# Patient Record
Sex: MALE | Race: WHITE | NOT HISPANIC OR LATINO | URBAN - METROPOLITAN AREA
[De-identification: names, ages, dates, MRNs, and addresses within clinical notes are randomized per-mention and may not be internally consistent; named-entity substitution may affect disease eponyms.]

---

## 2023-10-18 ENCOUNTER — E-VISIT (OUTPATIENT)
Dept: PRIMARY CARE | Facility: CLINIC | Age: 53
End: 2023-10-18

## 2023-10-18 DIAGNOSIS — H33.20 RETINAL DETACHMENT, UNSPECIFIED LATERALITY: Primary | ICD-10-CM

## 2023-10-18 PROCEDURE — 99421 OL DIG E/M SVC 5-10 MIN: CPT

## 2023-10-19 ENCOUNTER — OFFICE VISIT (OUTPATIENT)
Dept: OPHTHALMOLOGY | Facility: CLINIC | Age: 53
End: 2023-10-19
Payer: COMMERCIAL

## 2023-10-19 DIAGNOSIS — H35.413 BILATERAL RETINAL LATTICE DEGENERATION: Primary | ICD-10-CM

## 2023-10-19 DIAGNOSIS — H31.103 MYOPIC RETINOPATHY OF BOTH EYES: ICD-10-CM

## 2023-10-19 PROBLEM — H43.811 PVD (POSTERIOR VITREOUS DETACHMENT), RIGHT EYE: Status: ACTIVE | Noted: 2023-10-19

## 2023-10-19 PROCEDURE — 92134 CPTRZ OPH DX IMG PST SGM RTA: CPT | Mod: BILATERAL PROCEDURE | Performed by: OPHTHALMOLOGY

## 2023-10-19 PROCEDURE — 99214 OFFICE O/P EST MOD 30 MIN: CPT | Performed by: OPHTHALMOLOGY

## 2023-10-19 RX ORDER — OMEPRAZOLE 40 MG/1
40 CAPSULE, DELAYED RELEASE ORAL
COMMUNITY
End: 2024-04-19 | Stop reason: SDUPTHER

## 2023-10-19 RX ORDER — VENLAFAXINE 75 MG/1
75 TABLET ORAL 2 TIMES DAILY
COMMUNITY

## 2023-10-19 ASSESSMENT — VISUAL ACUITY
METHOD: SNELLEN - LINEAR
OS_CC: 20/60-2
OD_CC: 20/20-1

## 2023-10-19 ASSESSMENT — TONOMETRY
IOP_METHOD: TONOPEN
OD_IOP_MMHG: 19
OS_IOP_MMHG: 20

## 2023-10-19 ASSESSMENT — CUP TO DISC RATIO
OD_RATIO: 0.3
OS_RATIO: 0.3

## 2023-10-19 ASSESSMENT — SLIT LAMP EXAM - LIDS
COMMENTS: NORMAL
COMMENTS: NORMAL

## 2023-10-19 ASSESSMENT — ENCOUNTER SYMPTOMS: EYES NEGATIVE: 1

## 2023-10-19 NOTE — PROGRESS NOTES
Assessment/Plan   Diagnoses and all orders for this visit:  Bilateral retinal lattice degeneration  -     OCT, Retina - OU - Both Eyes  Myopic retinopathy of both eyes  Assessment/Plan   Diagnoses and all orders for this visit:  Bilateral retinal lattice degeneration  -     OCT, Retina - OU - Both Eyes  Myopic retinopathy of both eyes  #PVD both eyes (OU)  partial OS  Monitor for now  No signs of tears or detachments     HE HAS A NEW ONSET PVD right eye (OD) no RT RRD evident  RTC with Dr. Guevara in 10 d

## 2023-10-24 ENCOUNTER — APPOINTMENT (OUTPATIENT)
Dept: OPHTHALMOLOGY | Facility: CLINIC | Age: 53
End: 2023-10-24
Payer: COMMERCIAL

## 2023-10-30 ENCOUNTER — LAB (OUTPATIENT)
Dept: LAB | Facility: LAB | Age: 53
End: 2023-10-30
Payer: COMMERCIAL

## 2023-10-30 DIAGNOSIS — F33.9 MAJOR DEPRESSIVE DISORDER, RECURRENT, UNSPECIFIED (CMS-HCC): ICD-10-CM

## 2023-10-30 DIAGNOSIS — F33.9 MAJOR DEPRESSIVE DISORDER, RECURRENT, UNSPECIFIED (CMS-HCC): Primary | ICD-10-CM

## 2023-10-30 LAB
ALBUMIN SERPL BCP-MCNC: 4.3 G/DL (ref 3.4–5)
ALP SERPL-CCNC: 96 U/L (ref 33–120)
ALT SERPL W P-5'-P-CCNC: 18 U/L (ref 10–52)
ANION GAP SERPL CALC-SCNC: 10 MMOL/L (ref 10–20)
AST SERPL W P-5'-P-CCNC: 16 U/L (ref 9–39)
BASOPHILS # BLD AUTO: 0.05 X10*3/UL (ref 0–0.1)
BASOPHILS NFR BLD AUTO: 0.9 %
BILIRUB SERPL-MCNC: 0.6 MG/DL (ref 0–1.2)
BUN SERPL-MCNC: 13 MG/DL (ref 6–23)
CALCIUM SERPL-MCNC: 9.2 MG/DL (ref 8.6–10.3)
CHLORIDE SERPL-SCNC: 106 MMOL/L (ref 98–107)
CHOLEST SERPL-MCNC: 216 MG/DL (ref 0–199)
CHOLESTEROL/HDL RATIO: 8
CO2 SERPL-SCNC: 29 MMOL/L (ref 21–32)
CREAT SERPL-MCNC: 0.89 MG/DL (ref 0.5–1.3)
EOSINOPHIL # BLD AUTO: 0.08 X10*3/UL (ref 0–0.7)
EOSINOPHIL NFR BLD AUTO: 1.4 %
ERYTHROCYTE [DISTWIDTH] IN BLOOD BY AUTOMATED COUNT: 13 % (ref 11.5–14.5)
GFR SERPL CREATININE-BSD FRML MDRD: >90 ML/MIN/1.73M*2
GLUCOSE SERPL-MCNC: 97 MG/DL (ref 74–99)
HCT VFR BLD AUTO: 44.4 % (ref 41–52)
HDLC SERPL-MCNC: 26.9 MG/DL
HGB BLD-MCNC: 15 G/DL (ref 13.5–17.5)
HYALINE CASTS #/AREA URNS AUTO: ABNORMAL /LPF
IMM GRANULOCYTES # BLD AUTO: 0.03 X10*3/UL (ref 0–0.7)
IMM GRANULOCYTES NFR BLD AUTO: 0.5 % (ref 0–0.9)
LDLC SERPL CALC-MCNC: 149 MG/DL
LYMPHOCYTES # BLD AUTO: 2 X10*3/UL (ref 1.2–4.8)
LYMPHOCYTES NFR BLD AUTO: 34.8 %
MCH RBC QN AUTO: 29.2 PG (ref 26–34)
MCHC RBC AUTO-ENTMCNC: 33.8 G/DL (ref 32–36)
MCV RBC AUTO: 86 FL (ref 80–100)
MONOCYTES # BLD AUTO: 0.36 X10*3/UL (ref 0.1–1)
MONOCYTES NFR BLD AUTO: 6.3 %
MUCOUS THREADS #/AREA URNS AUTO: ABNORMAL /LPF
NEUTROPHILS # BLD AUTO: 3.23 X10*3/UL (ref 1.2–7.7)
NEUTROPHILS NFR BLD AUTO: 56.1 %
NON HDL CHOLESTEROL: 189 MG/DL (ref 0–149)
NRBC BLD-RTO: 0 /100 WBCS (ref 0–0)
PLATELET # BLD AUTO: 313 X10*3/UL (ref 150–450)
PMV BLD AUTO: 9.4 FL (ref 7.5–11.5)
POTASSIUM SERPL-SCNC: 4.3 MMOL/L (ref 3.5–5.3)
PROT SERPL-MCNC: 6.5 G/DL (ref 6.4–8.2)
PSA SERPL-MCNC: 1.43 NG/ML
RBC # BLD AUTO: 5.14 X10*6/UL (ref 4.5–5.9)
RBC #/AREA URNS AUTO: ABNORMAL /HPF
SODIUM SERPL-SCNC: 141 MMOL/L (ref 136–145)
TRIGL SERPL-MCNC: 199 MG/DL (ref 0–149)
TSH SERPL-ACNC: 0.69 MIU/L (ref 0.44–3.98)
VLDL: 40 MG/DL (ref 0–40)
WBC # BLD AUTO: 5.8 X10*3/UL (ref 4.4–11.3)
WBC #/AREA URNS AUTO: ABNORMAL /HPF

## 2023-10-30 PROCEDURE — 36415 COLL VENOUS BLD VENIPUNCTURE: CPT

## 2023-10-30 PROCEDURE — 81001 URINALYSIS AUTO W/SCOPE: CPT

## 2023-10-30 PROCEDURE — 85025 COMPLETE CBC W/AUTO DIFF WBC: CPT

## 2023-10-30 PROCEDURE — 80061 LIPID PANEL: CPT

## 2023-10-30 PROCEDURE — 84153 ASSAY OF PSA TOTAL: CPT

## 2023-10-30 PROCEDURE — 84443 ASSAY THYROID STIM HORMONE: CPT

## 2023-10-30 PROCEDURE — 80053 COMPREHEN METABOLIC PANEL: CPT

## 2023-11-06 PROBLEM — F43.23 ADJUSTMENT DISORDER WITH MIXED ANXIETY AND DEPRESSED MOOD: Status: ACTIVE | Noted: 2019-10-14

## 2023-11-06 PROBLEM — L21.9 SEBORRHEIC DERMATITIS, UNSPECIFIED: Status: ACTIVE | Noted: 2018-01-23

## 2023-11-06 PROBLEM — H33.001 RETINAL DETACHMENT OF RIGHT EYE WITH RETINAL BREAK: Status: ACTIVE | Noted: 2023-11-06

## 2023-11-06 PROBLEM — H35.411 LATTICE DEGENERATION OF PERIPHERAL RETINA, RIGHT: Status: ACTIVE | Noted: 2023-11-06

## 2023-11-06 PROBLEM — H33.002 MACULA-ON RHEGMATOGENOUS RETINAL DETACHMENT OF LEFT EYE: Status: ACTIVE | Noted: 2023-11-06

## 2023-11-06 PROBLEM — M47.812 FACET ARTHROPATHY, CERVICAL: Status: ACTIVE | Noted: 2023-11-06

## 2023-11-06 PROBLEM — R21 RASH AND OTHER NONSPECIFIC SKIN ERUPTION: Status: ACTIVE | Noted: 2018-01-23

## 2023-11-06 PROBLEM — B02.29 POST HERPETIC NEURALGIA: Status: ACTIVE | Noted: 2023-11-06

## 2023-11-06 PROBLEM — H33.22 LEFT RETINAL DETACHMENT: Status: ACTIVE | Noted: 2023-11-06

## 2023-11-06 RX ORDER — NAPROXEN 500 MG/1
500 TABLET ORAL EVERY 12 HOURS
COMMUNITY

## 2023-11-06 RX ORDER — KETOCONAZOLE 20 MG/ML
1 SHAMPOO, SUSPENSION TOPICAL
COMMUNITY
Start: 2017-11-14

## 2023-11-06 RX ORDER — CRISABOROLE 20 MG/G
1 OINTMENT TOPICAL
COMMUNITY
Start: 2018-01-23

## 2023-11-06 RX ORDER — CITALOPRAM 40 MG/1
40 TABLET, FILM COATED ORAL
COMMUNITY
Start: 2022-04-05 | End: 2023-11-06 | Stop reason: SDUPTHER

## 2023-11-06 RX ORDER — POLYETHYLENE GLYCOL 3350 17 G/17G
POWDER, FOR SOLUTION ORAL
COMMUNITY
Start: 2021-03-04

## 2023-11-06 RX ORDER — HYDROCODONE BITARTRATE AND ACETAMINOPHEN 5; 300 MG/1; MG/1
1 TABLET ORAL EVERY 8 HOURS PRN
COMMUNITY
Start: 2022-10-26

## 2023-11-06 RX ORDER — DICLOFENAC SODIUM 20 MG/G
SOLUTION TOPICAL 2 TIMES DAILY
COMMUNITY

## 2023-11-06 RX ORDER — TOBRAMYCIN AND DEXAMETHASONE 3; 1 MG/G; MG/G
OINTMENT OPHTHALMIC NIGHTLY PRN
COMMUNITY
Start: 2022-10-27

## 2023-11-06 RX ORDER — BACLOFEN 10 MG/1
10 TABLET ORAL 3 TIMES DAILY
COMMUNITY

## 2023-11-06 RX ORDER — MULTIVITAMIN
1 TABLET ORAL AS NEEDED
COMMUNITY
Start: 2016-10-27

## 2023-11-06 RX ORDER — PREDNISOLONE ACETATE 10 MG/ML
1 SUSPENSION/ DROPS OPHTHALMIC 4 TIMES DAILY
COMMUNITY
Start: 2022-11-07

## 2023-11-06 RX ORDER — CITALOPRAM 40 MG/1
40 TABLET, FILM COATED ORAL DAILY
COMMUNITY

## 2023-11-06 RX ORDER — OMEPRAZOLE 20 MG/1
20 CAPSULE, DELAYED RELEASE ORAL DAILY PRN
COMMUNITY

## 2023-11-06 RX ORDER — OXYCODONE AND ACETAMINOPHEN 5; 325 MG/1; MG/1
1 TABLET ORAL EVERY 12 HOURS
COMMUNITY
Start: 2022-11-07

## 2023-11-07 ENCOUNTER — FOLLOW-UP (OUTPATIENT)
Dept: OPHTHALMOLOGY | Facility: CLINIC | Age: 53
End: 2023-11-07
Payer: COMMERCIAL

## 2023-11-07 DIAGNOSIS — Z86.69 HX OF RETINAL DETACHMENT: ICD-10-CM

## 2023-11-07 DIAGNOSIS — H43.811 POSTERIOR VITREOUS DETACHMENT OF RIGHT EYE: Primary | ICD-10-CM

## 2023-11-07 DIAGNOSIS — H35.413 BILATERAL RETINAL LATTICE DEGENERATION: ICD-10-CM

## 2023-11-07 PROCEDURE — 92134 CPTRZ OPH DX IMG PST SGM RTA: CPT | Mod: BILATERAL PROCEDURE

## 2023-11-07 PROCEDURE — 99213 OFFICE O/P EST LOW 20 MIN: CPT

## 2023-11-07 ASSESSMENT — TONOMETRY
OS_IOP_MMHG: 19
IOP_METHOD: TONOPEN
OD_IOP_MMHG: 18

## 2023-11-07 ASSESSMENT — SLIT LAMP EXAM - LIDS
COMMENTS: NORMAL
COMMENTS: NORMAL

## 2023-11-07 ASSESSMENT — CUP TO DISC RATIO
OD_RATIO: 0.3
OS_RATIO: 0.3

## 2023-11-07 ASSESSMENT — VISUAL ACUITY
OD_CC: 20/20
OS_PH_CC: 20/50-3
OS_CC: 20/70-2
METHOD: SNELLEN - LINEAR

## 2023-11-07 NOTE — PROGRESS NOTES
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

## 2023-12-18 ENCOUNTER — APPOINTMENT (OUTPATIENT)
Dept: OPHTHALMOLOGY | Facility: CLINIC | Age: 53
End: 2023-12-18
Payer: COMMERCIAL

## 2024-04-19 DIAGNOSIS — K21.9 GASTROESOPHAGEAL REFLUX DISEASE WITHOUT ESOPHAGITIS: Primary | ICD-10-CM

## 2024-04-19 RX ORDER — OMEPRAZOLE 40 MG/1
40 CAPSULE, DELAYED RELEASE ORAL
Qty: 90 CAPSULE | Refills: 3 | Status: SHIPPED | OUTPATIENT
Start: 2024-04-19 | End: 2025-04-19

## 2024-04-19 NOTE — TELEPHONE ENCOUNTER
Patient called in asking for omeprazole refilled. He moved and is still looking for a new PCP but is going to be out of this medication. Last seen was May 2023

## 2024-06-13 ENCOUNTER — APPOINTMENT (OUTPATIENT)
Dept: PRIMARY CARE | Facility: CLINIC | Age: 54
End: 2024-06-13
Payer: COMMERCIAL

## 2024-06-13 VITALS
HEIGHT: 69 IN | HEART RATE: 74 BPM | BODY MASS INDEX: 26.07 KG/M2 | RESPIRATION RATE: 17 BRPM | OXYGEN SATURATION: 97 % | DIASTOLIC BLOOD PRESSURE: 65 MMHG | WEIGHT: 176 LBS | SYSTOLIC BLOOD PRESSURE: 105 MMHG

## 2024-06-13 DIAGNOSIS — E78.00 HYPERCHOLESTEROLEMIA: ICD-10-CM

## 2024-06-13 DIAGNOSIS — K21.9 GASTROESOPHAGEAL REFLUX DISEASE WITHOUT ESOPHAGITIS: ICD-10-CM

## 2024-06-13 DIAGNOSIS — Z00.00 ANNUAL PHYSICAL EXAM: Primary | ICD-10-CM

## 2024-06-13 PROCEDURE — 1036F TOBACCO NON-USER: CPT | Performed by: INTERNAL MEDICINE

## 2024-06-13 PROCEDURE — 99396 PREV VISIT EST AGE 40-64: CPT | Performed by: INTERNAL MEDICINE

## 2024-06-13 ASSESSMENT — ENCOUNTER SYMPTOMS
ENDOCRINE NEGATIVE: 1
RESPIRATORY NEGATIVE: 1
HEMATOLOGIC/LYMPHATIC NEGATIVE: 1
NEUROLOGICAL NEGATIVE: 1
ALLERGIC/IMMUNOLOGIC NEGATIVE: 1
GASTROINTESTINAL NEGATIVE: 1
MUSCULOSKELETAL NEGATIVE: 1
PSYCHIATRIC NEGATIVE: 1
CARDIOVASCULAR NEGATIVE: 1
EYES NEGATIVE: 1
CONSTITUTIONAL NEGATIVE: 1

## 2024-06-13 NOTE — PROGRESS NOTES
"Subjective   Patient ID: Roger Dempsey is a 54 y.o. male who presents for New Patient Visit (New patient visit-est. [facility]).    HPI     Review of Systems   Constitutional: Negative.    HENT: Negative.     Eyes: Negative.    Respiratory: Negative.     Cardiovascular: Negative.    Gastrointestinal: Negative.    Endocrine: Negative.    Musculoskeletal: Negative.    Skin: Negative.    Allergic/Immunologic: Negative.    Neurological: Negative.    Hematological: Negative.    Psychiatric/Behavioral: Negative.     All other systems reviewed and are negative.      Objective   Pulse 74   Resp 17   Ht 1.753 m (5' 9\")   Wt 79.8 kg (176 lb)   SpO2 97%   BMI 25.99 kg/m²   Blood pressure 105/65, pulse 74, resp. rate 17, height 1.753 m (5' 9\"), weight 79.8 kg (176 lb), SpO2 97%.   Physical Exam  Vitals and nursing note reviewed.   Constitutional:       Appearance: Normal appearance.   HENT:      Head: Normocephalic and atraumatic.      Right Ear: Tympanic membrane, ear canal and external ear normal.      Left Ear: Tympanic membrane, ear canal and external ear normal. There is no impacted cerumen.      Nose: Nose normal.      Mouth/Throat:      Mouth: Mucous membranes are moist.      Pharynx: Oropharynx is clear.   Eyes:      Extraocular Movements: Extraocular movements intact.      Conjunctiva/sclera: Conjunctivae normal.      Pupils: Pupils are equal, round, and reactive to light.   Cardiovascular:      Rate and Rhythm: Normal rate and regular rhythm.      Pulses: Normal pulses.      Heart sounds: Normal heart sounds. No murmur heard.  Pulmonary:      Effort: Pulmonary effort is normal. No respiratory distress.      Breath sounds: Normal breath sounds. No stridor. No wheezing, rhonchi or rales.   Chest:      Chest wall: No tenderness.   Abdominal:      General: Abdomen is flat. Bowel sounds are normal. There is no distension.      Palpations: Abdomen is soft. There is no mass.      Tenderness: There is no abdominal tenderness. " There is no right CVA tenderness, left CVA tenderness, guarding or rebound.      Hernia: No hernia is present.   Musculoskeletal:         General: Normal range of motion.      Cervical back: Normal range of motion and neck supple.   Skin:     General: Skin is warm.      Capillary Refill: Capillary refill takes less than 2 seconds.   Neurological:      General: No focal deficit present.      Mental Status: He is alert.      Cranial Nerves: No cranial nerve deficit.      Sensory: No sensory deficit.      Motor: No weakness.      Coordination: Coordination normal.      Gait: Gait normal.      Deep Tendon Reflexes: Reflexes normal.   Psychiatric:         Mood and Affect: Mood normal.         Behavior: Behavior normal. Behavior is cooperative.         Thought Content: Thought content normal.         Judgment: Judgment normal.         Assessment/Plan   Problem List Items Addressed This Visit             ICD-10-CM    Annual physical exam - Primary Z00.00     No recent hospitalizations.    All medications reviewed and reconciled by me the provider..  No use of controlled substances or opiates.    Family history, social history reviewed, no changes.    Patient does not smoke.    Patient does not drink.    Patient hydrates adequately daily.  Eats a well-balanced healthy diet.     Exercises adequately including walking and doing weightbearing exercises.    Patient denies any difficulty with memory or cognition.     Denies any difficulty with hearing.  Patient does not wear hearing aids.    No fall risk.  No recent falls.  Denies any difficulty walking.    Patient with no history of depression anxiety, denies any loss of interest, no feeling of sadness, no lack of motivation.    Patient is independent in all ADLs and IADLs.  Independent bathing, dressing, walking.  Takes care of own finances, shopping and cooking.     End-of-life decision-making power of  reviewed with patient.     Preventive measures - Recommend ASAP :  . Colonoscopy (educate patient risks of colon cancer) refer patient to GI specialist. Ophthalmology and retina exam recommend yearly exams refer patient to an Ophthalmologist. BPH - (Benign Prostatic Hypertrophy) refer patient to an Urologist for rectal exam and PSA check.          Relevant Orders    CBC and Auto Differential    Comprehensive Metabolic Panel    Lipid Panel    TSH with reflex to Free T4 if abnormal    Prostate Specific Antigen    Hypercholesterolemia E78.00     Hypercholesterolemia - Monitor lipid profile and educate patient upon risks of high cholesterol and targets. Educate diet and change in lifestyle and increase in exercises - Refill:   and educate compliance with medication and diet.           Gastroesophageal reflux disease without esophagitis K21.9     GERD - Acid reflux disease. Rx. PPI (Prilosec/Prevacid/Protonix/Nexium) and educate diet and life style changes. Referred patient to an endoscopy (EGD) and check H. Pylori titers.

## 2024-06-13 NOTE — ASSESSMENT & PLAN NOTE
Hypercholesterolemia - Monitor lipid profile and educate patient upon risks of high cholesterol and targets. Educate diet and change in lifestyle and increase in exercises - Refill:   and educate compliance with medication and diet.

## 2024-06-13 NOTE — ASSESSMENT & PLAN NOTE
No recent hospitalizations.    All medications reviewed and reconciled by me the provider..  No use of controlled substances or opiates.    Family history, social history reviewed, no changes.    Patient does not smoke.    Patient does not drink.    Patient hydrates adequately daily.  Eats a well-balanced healthy diet.     Exercises adequately including walking and doing weightbearing exercises.    Patient denies any difficulty with memory or cognition.     Denies any difficulty with hearing.  Patient does not wear hearing aids.    No fall risk.  No recent falls.  Denies any difficulty walking.    Patient with no history of depression anxiety, denies any loss of interest, no feeling of sadness, no lack of motivation.    Patient is independent in all ADLs and IADLs.  Independent bathing, dressing, walking.  Takes care of own finances, shopping and cooking.     End-of-life decision-making power of  reviewed with patient.     Preventive measures - Recommend ASAP : . Colonoscopy (educate patient risks of colon cancer) refer patient to GI specialist. Ophthalmology and retina exam recommend yearly exams refer patient to an Ophthalmologist. BPH - (Benign Prostatic Hypertrophy) refer patient to an Urologist for rectal exam and PSA check.

## 2024-06-24 DIAGNOSIS — F32.A DEPRESSION, UNSPECIFIED DEPRESSION TYPE: ICD-10-CM

## 2024-06-24 DIAGNOSIS — K21.9 GASTROESOPHAGEAL REFLUX DISEASE WITHOUT ESOPHAGITIS: ICD-10-CM

## 2024-06-24 RX ORDER — VENLAFAXINE 75 MG/1
75 TABLET ORAL 2 TIMES DAILY
Qty: 180 TABLET | Refills: 0 | Status: SHIPPED | OUTPATIENT
Start: 2024-06-24

## 2024-06-24 RX ORDER — OMEPRAZOLE 40 MG/1
40 CAPSULE, DELAYED RELEASE ORAL
Qty: 90 CAPSULE | Refills: 3 | Status: SHIPPED | OUTPATIENT
Start: 2024-06-24 | End: 2025-06-24

## 2024-08-05 ENCOUNTER — LAB (OUTPATIENT)
Dept: LAB | Facility: LAB | Age: 54
End: 2024-08-05
Payer: COMMERCIAL

## 2024-08-05 DIAGNOSIS — Z00.00 ANNUAL PHYSICAL EXAM: ICD-10-CM

## 2024-08-05 LAB
ALBUMIN SERPL-MCNC: 4.5 G/DL (ref 3.5–5)
ALP BLD-CCNC: 107 U/L (ref 35–125)
ALT SERPL-CCNC: 16 U/L (ref 5–40)
ANION GAP SERPL CALC-SCNC: 11 MMOL/L
AST SERPL-CCNC: 14 U/L (ref 5–40)
BASOPHILS # BLD AUTO: 0.05 X10*3/UL (ref 0–0.1)
BASOPHILS NFR BLD AUTO: 0.9 %
BILIRUB SERPL-MCNC: 0.5 MG/DL (ref 0.1–1.2)
BUN SERPL-MCNC: 14 MG/DL (ref 8–25)
CALCIUM SERPL-MCNC: 10 MG/DL (ref 8.5–10.4)
CHLORIDE SERPL-SCNC: 102 MMOL/L (ref 97–107)
CHOLEST SERPL-MCNC: 251 MG/DL (ref 133–200)
CHOLEST/HDLC SERPL: 7.8 {RATIO}
CO2 SERPL-SCNC: 30 MMOL/L (ref 24–31)
CREAT SERPL-MCNC: 1 MG/DL (ref 0.4–1.6)
EGFRCR SERPLBLD CKD-EPI 2021: 89 ML/MIN/1.73M*2
EOSINOPHIL # BLD AUTO: 0.1 X10*3/UL (ref 0–0.7)
EOSINOPHIL NFR BLD AUTO: 1.7 %
ERYTHROCYTE [DISTWIDTH] IN BLOOD BY AUTOMATED COUNT: 12.8 % (ref 11.5–14.5)
GLUCOSE SERPL-MCNC: 101 MG/DL (ref 65–99)
HCT VFR BLD AUTO: 48.1 % (ref 41–52)
HDLC SERPL-MCNC: 32 MG/DL
HGB BLD-MCNC: 16.3 G/DL (ref 13.5–17.5)
IMM GRANULOCYTES # BLD AUTO: 0.05 X10*3/UL (ref 0–0.7)
IMM GRANULOCYTES NFR BLD AUTO: 0.9 % (ref 0–0.9)
LDLC SERPL CALC-MCNC: 183 MG/DL (ref 65–130)
LYMPHOCYTES # BLD AUTO: 1.88 X10*3/UL (ref 1.2–4.8)
LYMPHOCYTES NFR BLD AUTO: 32.4 %
MCH RBC QN AUTO: 29 PG (ref 26–34)
MCHC RBC AUTO-ENTMCNC: 33.9 G/DL (ref 32–36)
MCV RBC AUTO: 85 FL (ref 80–100)
MONOCYTES # BLD AUTO: 0.36 X10*3/UL (ref 0.1–1)
MONOCYTES NFR BLD AUTO: 6.2 %
NEUTROPHILS # BLD AUTO: 3.37 X10*3/UL (ref 1.2–7.7)
NEUTROPHILS NFR BLD AUTO: 57.9 %
NRBC BLD-RTO: 0 /100 WBCS (ref 0–0)
PLATELET # BLD AUTO: 307 X10*3/UL (ref 150–450)
POTASSIUM SERPL-SCNC: 4.7 MMOL/L (ref 3.4–5.1)
PROT SERPL-MCNC: 7 G/DL (ref 5.9–7.9)
PSA SERPL-MCNC: 1.4 NG/ML
RBC # BLD AUTO: 5.63 X10*6/UL (ref 4.5–5.9)
SODIUM SERPL-SCNC: 143 MMOL/L (ref 133–145)
TRIGL SERPL-MCNC: 178 MG/DL (ref 40–150)
TSH SERPL DL<=0.05 MIU/L-ACNC: 1.2 MIU/L (ref 0.27–4.2)
WBC # BLD AUTO: 5.8 X10*3/UL (ref 4.4–11.3)

## 2024-08-05 PROCEDURE — 36415 COLL VENOUS BLD VENIPUNCTURE: CPT

## 2024-08-05 PROCEDURE — 80061 LIPID PANEL: CPT

## 2024-08-05 PROCEDURE — 85025 COMPLETE CBC W/AUTO DIFF WBC: CPT

## 2024-08-05 PROCEDURE — 84153 ASSAY OF PSA TOTAL: CPT

## 2024-08-05 PROCEDURE — 84443 ASSAY THYROID STIM HORMONE: CPT

## 2024-08-05 PROCEDURE — 80053 COMPREHEN METABOLIC PANEL: CPT

## 2024-08-06 DIAGNOSIS — E78.00 HYPERCHOLESTEROLEMIA: ICD-10-CM

## 2024-08-06 RX ORDER — ROSUVASTATIN CALCIUM 5 MG/1
5 TABLET, COATED ORAL DAILY
Qty: 100 TABLET | Refills: 0 | Status: SHIPPED | OUTPATIENT
Start: 2024-08-06

## 2024-08-09 DIAGNOSIS — F41.8 ANXIETY ASSOCIATED WITH DEPRESSION: Primary | ICD-10-CM

## 2024-08-09 RX ORDER — VENLAFAXINE HYDROCHLORIDE 75 MG/1
CAPSULE, EXTENDED RELEASE ORAL
Qty: 30 CAPSULE | Refills: 5 | Status: SHIPPED | OUTPATIENT
Start: 2024-08-09

## 2024-08-27 ENCOUNTER — APPOINTMENT (OUTPATIENT)
Dept: OPHTHALMOLOGY | Facility: CLINIC | Age: 54
End: 2024-08-27
Payer: COMMERCIAL

## 2024-08-27 DIAGNOSIS — H35.413 BILATERAL RETINAL LATTICE DEGENERATION: Primary | ICD-10-CM

## 2024-08-27 PROCEDURE — 99214 OFFICE O/P EST MOD 30 MIN: CPT | Performed by: OPHTHALMOLOGY

## 2024-08-27 PROCEDURE — 92083 EXTENDED VISUAL FIELD XM: CPT | Performed by: OPHTHALMOLOGY

## 2024-08-27 PROCEDURE — 92133 CPTRZD OPH DX IMG PST SGM ON: CPT | Performed by: OPHTHALMOLOGY

## 2024-08-27 ASSESSMENT — ENCOUNTER SYMPTOMS
CONSTITUTIONAL NEGATIVE: 0
CARDIOVASCULAR NEGATIVE: 0
RESPIRATORY NEGATIVE: 0
HEMATOLOGIC/LYMPHATIC NEGATIVE: 0
EYES NEGATIVE: 0
NEUROLOGICAL NEGATIVE: 0
MUSCULOSKELETAL NEGATIVE: 0
ALLERGIC/IMMUNOLOGIC NEGATIVE: 0
ENDOCRINE NEGATIVE: 0
GASTROINTESTINAL NEGATIVE: 0
PSYCHIATRIC NEGATIVE: 0

## 2024-08-27 ASSESSMENT — SLIT LAMP EXAM - LIDS
COMMENTS: NORMAL
COMMENTS: NORMAL

## 2024-08-27 ASSESSMENT — REFRACTION_WEARINGRX
SPECS_TYPE: PAL
OS_ADD: +1.50
OS_SPHERE: -3.75
OD_AXIS: 115
OD_SPHERE: -1.50
OD_CYLINDER: -2.00
OS_CYLINDER: -2.25
OD_ADD: +1.50
OS_AXIS: 065

## 2024-08-27 ASSESSMENT — VISUAL ACUITY
OS_PH_CC+: -1
OS_PH_CC: 20/100
METHOD: SNELLEN - LINEAR
OS_CC+: -2
CORRECTION_TYPE: GLASSES
OS_CC: 20/200
OD_CC: 20/20

## 2024-08-27 ASSESSMENT — CONF VISUAL FIELD
OS_SUPERIOR_NASAL_RESTRICTION: 0
OD_SUPERIOR_TEMPORAL_RESTRICTION: 0
OS_SUPERIOR_TEMPORAL_RESTRICTION: 0
OS_INFERIOR_TEMPORAL_RESTRICTION: 0
OD_SUPERIOR_NASAL_RESTRICTION: 0
OD_INFERIOR_TEMPORAL_RESTRICTION: 0
METHOD: COUNTING FINGERS
OS_NORMAL: 1
OD_INFERIOR_NASAL_RESTRICTION: 0
OS_INFERIOR_NASAL_RESTRICTION: 0
OD_NORMAL: 1

## 2024-08-27 ASSESSMENT — TONOMETRY
IOP_METHOD: GOLDMANN APPLANATION
OD_IOP_MMHG: 14
OS_IOP_MMHG: 16

## 2024-08-27 ASSESSMENT — PACHYMETRY
OD_CT(UM): 626
OS_CT(UM): 619

## 2024-08-27 ASSESSMENT — GONIOSCOPY
OD_SUPERIOR: 3/360
OS_SUPERIOR: 3/360

## 2024-08-27 ASSESSMENT — CUP TO DISC RATIO
OD_RATIO: 0.4
OS_RATIO: 0.45

## 2024-08-27 NOTE — PROGRESS NOTES
1-oag suspect vf changes due to rd repair  CCT increased, iop adjusted 09/011    2-ns ou os>>od    3-anthony tears ou      Pt having trouble driving, rba told, wishes to proceed    Cat iol os general anesthesia

## 2024-09-13 ENCOUNTER — APPOINTMENT (OUTPATIENT)
Dept: OPHTHALMOLOGY | Facility: CLINIC | Age: 54
End: 2024-09-13
Payer: COMMERCIAL

## 2024-09-13 DIAGNOSIS — H35.413 BILATERAL RETINAL LATTICE DEGENERATION: ICD-10-CM

## 2024-09-13 DIAGNOSIS — Z86.69 HX OF RETINAL DETACHMENT: ICD-10-CM

## 2024-09-13 DIAGNOSIS — H25.812 COMBINED FORM OF AGE-RELATED CATARACT, LEFT EYE: Primary | ICD-10-CM

## 2024-09-13 DIAGNOSIS — H17.9 CORNEAL SCAR, LEFT EYE: ICD-10-CM

## 2024-09-13 LAB
SIMK FLAT (OS): 43.6 DIOPTERS
SIMK STEEP (OS): 45.3 DIOPTERS

## 2024-09-13 RX ORDER — CYCLOPENTOLATE HYDROCHLORIDE 10 MG/ML
1 SOLUTION/ DROPS OPHTHALMIC
OUTPATIENT
Start: 2024-09-13 | End: 2024-09-13

## 2024-09-13 RX ORDER — TROPICAMIDE 10 MG/ML
1 SOLUTION/ DROPS OPHTHALMIC
OUTPATIENT
Start: 2024-09-13 | End: 2024-09-13

## 2024-09-13 RX ORDER — PHENYLEPHRINE HYDROCHLORIDE 25 MG/ML
1 SOLUTION/ DROPS OPHTHALMIC
OUTPATIENT
Start: 2024-09-13 | End: 2024-09-13

## 2024-09-13 RX ORDER — TETRACAINE HYDROCHLORIDE 5 MG/ML
1 SOLUTION OPHTHALMIC ONCE
OUTPATIENT
Start: 2024-09-13 | End: 2024-09-13

## 2024-09-13 RX ORDER — MOXIFLOXACIN 5 MG/ML
1 SOLUTION/ DROPS OPHTHALMIC
OUTPATIENT
Start: 2024-09-13 | End: 2024-09-13

## 2024-09-13 ASSESSMENT — CUP TO DISC RATIO: OS_RATIO: 0.45

## 2024-09-13 ASSESSMENT — REFRACTION_MANIFEST
OD_AXIS: 128
OD_SPHERE: -2.00
OD_CYLINDER: -2.50
METHOD_AUTOREFRACTION: 1
OS_SPHERE: UTO

## 2024-09-13 ASSESSMENT — REFRACTION_WEARINGRX
SPECS_TYPE: PAL
OD_SPHERE: -1.50
OD_ADD: +1.50
OD_CYLINDER: -2.00
OS_ADD: +1.50
OD_AXIS: 115
OS_CYLINDER: -2.25
OS_AXIS: 065
OS_SPHERE: -3.75

## 2024-09-13 ASSESSMENT — CONF VISUAL FIELD
OD_INFERIOR_TEMPORAL_RESTRICTION: 0
OS_SUPERIOR_TEMPORAL_RESTRICTION: 0
OS_INFERIOR_NASAL_RESTRICTION: 0
OD_SUPERIOR_NASAL_RESTRICTION: 0
OS_INFERIOR_TEMPORAL_RESTRICTION: 0
OS_SUPERIOR_NASAL_RESTRICTION: 0
OD_NORMAL: 1
OD_INFERIOR_NASAL_RESTRICTION: 0
OS_NORMAL: 1
OD_SUPERIOR_TEMPORAL_RESTRICTION: 0

## 2024-09-13 ASSESSMENT — TONOMETRY
OS_IOP_MMHG: 22
OS_IOP_MMHG: 18
IOP_METHOD: GOLDMANN APPLANATION
OD_IOP_MMHG: 14
IOP_METHOD: GOLDMANN APPLANATION

## 2024-09-13 ASSESSMENT — EXTERNAL EXAM - LEFT EYE: OS_EXAM: NORMAL

## 2024-09-13 ASSESSMENT — PACHYMETRY
OS_CT(UM): 619
OD_CT(UM): 626

## 2024-09-13 ASSESSMENT — VISUAL ACUITY
METHOD: SNELLEN - LINEAR
OS_PH_SC: 20/60-2
OD_CC: 20/20
CORRECTION_TYPE: GLASSES
OS_CC: 20/200-2

## 2024-09-13 ASSESSMENT — EXTERNAL EXAM - RIGHT EYE: OD_EXAM: NORMAL

## 2024-09-13 ASSESSMENT — SLIT LAMP EXAM - LIDS
COMMENTS: NORMAL
COMMENTS: NORMAL

## 2024-09-13 ASSESSMENT — ENCOUNTER SYMPTOMS: EYES NEGATIVE: 1

## 2024-09-13 NOTE — PROGRESS NOTES
Assessment/Plan   Diagnoses and all orders for this visit:  Combined form of age-related cataract, left eye  Combined form of age-related cataract, left eyeH25.812  Visually significant. Pt would like to proceed with surgery.    Visually significant cataract OS. BCVA: 20/200. Symptoms: blurry vision, glare. A change in glasses prescription will not result in significant visual improvement at this time.  Indication for cataract surgery: Input To potentially improve visual acuity and improve quality of life/reduce symptoms.   Based on a comprehensive eye exam performed today, a visually significant cataract appears to be the source of decreased vision, diminished quality of life, and impairment of activities of daily living. Discussed option of cataract surgery vs observation. Patient can no longer function adequately with current best corrected visual acuity and wishes to have cataract surgery at this time. Discussed surgical procedure with patient. As a result of cataract extraction, it is believed that the patient will experience improved vision. Discussed potential risks, benefits, and complications of cataract surgery including but not limited to pain, bleeding, infection, inflammation, edema, increased eye pressure, retinal tear/detachment, lens dislocation, ptosis, iris damage, need for additional surgery, need for glasses after surgery, loss of vision/loss of eye. Patient understands and wishes to proceed. All questions were answered. Will schedule cataract surgery OS. Lenstar done today.   Discussed IOL options (standard monofocal, monofocal with monovision, toric, multifocal). Lens chosen: standard monofocal. Defer/decline toric/multifocal lens at this time. Had thorough discussion with patient re: aim. Discussed that may potentially need glasses for best vision both at distance and at near.     Schedule cataract surgery OS  I personally reviewed the lenstar measurements and will choose the lens  accordingly.  Bilateral retinal lattice degeneration  Hx of retinal detachment  S/p scleral buckling procedure (SB) OS, laser OU (Dr. Guevara)  Corneal scar, left eye  -     Corneal Topography - OU - Both Eyes  Inferonasal limbal scar OS

## 2024-09-20 NOTE — H&P
"History Of Present Illness  Roger Dempsey is a 54 y.o. male presenting with combined age-related cataract of the left eye .     Past Medical History  Past Medical History:   Diagnosis Date    Retinal detachment Oct 2022       Surgical History  Past Surgical History:   Procedure Laterality Date    OTHER SURGICAL HISTORY      RETINAL DETACHMENT SURGERY  Oct 2022        Social History  He reports that he has never smoked. He has never used smokeless tobacco. He reports that he does not currently use alcohol after a past usage of about 1.0 standard drink of alcohol per week. He reports that he does not use drugs.    Family History  Family History   Problem Relation Name Age of Onset    Cataracts Mother Paula Dempsey     Blindness Father Swapnil Dempsey     Cataracts Father Swapnil Dempsey     Macular degeneration Father Swapnil Dempsey     Cancer Paternal Grandmother Zainab Dempsey     Cancer Father's Brother Richar Dempsey         Allergies  Amoxicillin    Current Outpatient Medications   Medication Instructions    multivitamin tablet 1 tablet, oral, As needed    omeprazole (PRILOSEC) 40 mg, oral, Daily before breakfast, Do not crush or chew.    rosuvastatin (CRESTOR) 5 mg, oral, Daily    venlafaxine (EFFEXOR) 75 mg, oral, 2 times daily    venlafaxine XR (Effexor-XR) 75 mg 24 hr capsule oral, Daily before breakfast        Review of Systems  Constitutional: Negative.    HENT: Negative.     Eyes: Negative except as noted in HPI.    Respiratory: Negative.     Cardiovascular: Negative.    Gastrointestinal: Negative.        Physical Exam  General: A&Ox3  HEENT: NC/AT  Lungs: CTAB, no signs of respiratory distress  CV: RRR  Abdomen: soft, NT/ND      Last Recorded Vitals  Blood pressure 116/77, pulse 75, temperature 36 °C (96.8 °F), temperature source Temporal, resp. rate 16, height 1.727 m (5' 8\"), weight 80 kg (176 lb 5.9 oz), SpO2 95%.       Assessment/Plan   Assessment & Plan  Combined form of age-related cataract, left " eye      Roger Dempsey is a 54 y.o. male with combined age-related cataract of the left eye presenting for cataract extraction and insertion of intraocular lens of the LEFT eye. Consent obtained 9/13/2024.            Dilshad Rich MD

## 2024-09-24 DIAGNOSIS — F32.A DEPRESSION, UNSPECIFIED DEPRESSION TYPE: ICD-10-CM

## 2024-09-24 RX ORDER — VENLAFAXINE 75 MG/1
75 TABLET ORAL 2 TIMES DAILY
Qty: 180 TABLET | Refills: 0 | Status: SHIPPED | OUTPATIENT
Start: 2024-09-24

## 2024-09-26 ENCOUNTER — ANESTHESIA (OUTPATIENT)
Dept: OPERATING ROOM | Facility: CLINIC | Age: 54
End: 2024-09-26
Payer: COMMERCIAL

## 2024-09-26 ENCOUNTER — ANESTHESIA EVENT (OUTPATIENT)
Dept: OPERATING ROOM | Facility: CLINIC | Age: 54
End: 2024-09-26
Payer: COMMERCIAL

## 2024-09-26 ENCOUNTER — HOSPITAL ENCOUNTER (OUTPATIENT)
Facility: CLINIC | Age: 54
Setting detail: OUTPATIENT SURGERY
Discharge: HOME | End: 2024-09-26
Attending: OPHTHALMOLOGY | Admitting: OPHTHALMOLOGY
Payer: COMMERCIAL

## 2024-09-26 ENCOUNTER — APPOINTMENT (OUTPATIENT)
Dept: OPHTHALMOLOGY | Facility: CLINIC | Age: 54
End: 2024-09-26
Payer: COMMERCIAL

## 2024-09-26 VITALS
BODY MASS INDEX: 26.73 KG/M2 | HEART RATE: 62 BPM | DIASTOLIC BLOOD PRESSURE: 64 MMHG | WEIGHT: 176.37 LBS | TEMPERATURE: 96.8 F | OXYGEN SATURATION: 97 % | RESPIRATION RATE: 15 BRPM | HEIGHT: 68 IN | SYSTOLIC BLOOD PRESSURE: 103 MMHG

## 2024-09-26 DIAGNOSIS — H25.812 COMBINED FORM OF AGE-RELATED CATARACT, LEFT EYE: Primary | ICD-10-CM

## 2024-09-26 PROCEDURE — 7100000010 HC PHASE TWO TIME - EACH INCREMENTAL 1 MINUTE: Performed by: OPHTHALMOLOGY

## 2024-09-26 PROCEDURE — 3600000003 HC OR TIME - INITIAL BASE CHARGE - PROCEDURE LEVEL THREE: Performed by: OPHTHALMOLOGY

## 2024-09-26 PROCEDURE — 2500000004 HC RX 250 GENERAL PHARMACY W/ HCPCS (ALT 636 FOR OP/ED): Performed by: ANESTHESIOLOGY

## 2024-09-26 PROCEDURE — 2500000005 HC RX 250 GENERAL PHARMACY W/O HCPCS: Performed by: OPHTHALMOLOGY

## 2024-09-26 PROCEDURE — 3700000001 HC GENERAL ANESTHESIA TIME - INITIAL BASE CHARGE: Performed by: OPHTHALMOLOGY

## 2024-09-26 PROCEDURE — 7100000009 HC PHASE TWO TIME - INITIAL BASE CHARGE: Performed by: OPHTHALMOLOGY

## 2024-09-26 PROCEDURE — V2787 ASTIGMATISM-CORRECT FUNCTION: HCPCS | Performed by: OPHTHALMOLOGY

## 2024-09-26 PROCEDURE — 99024 POSTOP FOLLOW-UP VISIT: CPT | Performed by: OPHTHALMOLOGY

## 2024-09-26 PROCEDURE — A66984 PR REMV CATARACT EXTRACAP,INSERT LENS: Performed by: ANESTHESIOLOGY

## 2024-09-26 PROCEDURE — 66982 XCAPSL CTRC RMVL CPLX WO ECP: CPT | Performed by: OPHTHALMOLOGY

## 2024-09-26 PROCEDURE — 2500000001 HC RX 250 WO HCPCS SELF ADMINISTERED DRUGS (ALT 637 FOR MEDICARE OP): Performed by: OPHTHALMOLOGY

## 2024-09-26 PROCEDURE — 2500000004 HC RX 250 GENERAL PHARMACY W/ HCPCS (ALT 636 FOR OP/ED): Performed by: OPHTHALMOLOGY

## 2024-09-26 PROCEDURE — 2760000001 HC OR 276 NO HCPCS: Performed by: OPHTHALMOLOGY

## 2024-09-26 PROCEDURE — 3600000008 HC OR TIME - EACH INCREMENTAL 1 MINUTE - PROCEDURE LEVEL THREE: Performed by: OPHTHALMOLOGY

## 2024-09-26 PROCEDURE — 2720000007 HC OR 272 NO HCPCS: Performed by: OPHTHALMOLOGY

## 2024-09-26 PROCEDURE — 3700000002 HC GENERAL ANESTHESIA TIME - EACH INCREMENTAL 1 MINUTE: Performed by: OPHTHALMOLOGY

## 2024-09-26 DEVICE — IMPLANTABLE DEVICE: Type: IMPLANTABLE DEVICE | Site: EYE | Status: FUNCTIONAL

## 2024-09-26 RX ORDER — CYCLOPENTOLATE HYDROCHLORIDE 10 MG/ML
1 SOLUTION/ DROPS OPHTHALMIC
Status: COMPLETED | OUTPATIENT
Start: 2024-09-26 | End: 2024-09-26

## 2024-09-26 RX ORDER — HYDROMORPHONE HYDROCHLORIDE 0.2 MG/ML
0.2 INJECTION INTRAMUSCULAR; INTRAVENOUS; SUBCUTANEOUS EVERY 5 MIN PRN
Status: DISCONTINUED | OUTPATIENT
Start: 2024-09-26 | End: 2024-09-26 | Stop reason: HOSPADM

## 2024-09-26 RX ORDER — TROPICAMIDE 10 MG/ML
1 SOLUTION/ DROPS OPHTHALMIC
Status: COMPLETED | OUTPATIENT
Start: 2024-09-26 | End: 2024-09-26

## 2024-09-26 RX ORDER — MIDAZOLAM HYDROCHLORIDE 1 MG/ML
1 INJECTION, SOLUTION INTRAMUSCULAR; INTRAVENOUS ONCE AS NEEDED
Status: DISCONTINUED | OUTPATIENT
Start: 2024-09-26 | End: 2024-09-26 | Stop reason: HOSPADM

## 2024-09-26 RX ORDER — MOXIFLOXACIN 5 MG/ML
SOLUTION/ DROPS OPHTHALMIC AS NEEDED
Status: DISCONTINUED | OUTPATIENT
Start: 2024-09-26 | End: 2024-09-26 | Stop reason: HOSPADM

## 2024-09-26 RX ORDER — SODIUM CHLORIDE 0.9 % (FLUSH) 0.9 %
SYRINGE (ML) INJECTION AS NEEDED
Status: DISCONTINUED | OUTPATIENT
Start: 2024-09-26 | End: 2024-09-26

## 2024-09-26 RX ORDER — SODIUM CHLORIDE, SODIUM LACTATE, POTASSIUM CHLORIDE, CALCIUM CHLORIDE 600; 310; 30; 20 MG/100ML; MG/100ML; MG/100ML; MG/100ML
100 INJECTION, SOLUTION INTRAVENOUS CONTINUOUS
Status: DISCONTINUED | OUTPATIENT
Start: 2024-09-26 | End: 2024-09-26 | Stop reason: HOSPADM

## 2024-09-26 RX ORDER — TRIAMCINOLONE ACETONIDE 40 MG/ML
INJECTION, SUSPENSION INTRA-ARTICULAR; INTRAMUSCULAR AS NEEDED
Status: DISCONTINUED | OUTPATIENT
Start: 2024-09-26 | End: 2024-09-26 | Stop reason: HOSPADM

## 2024-09-26 RX ORDER — PHENYLEPHRINE HYDROCHLORIDE 25 MG/ML
1 SOLUTION/ DROPS OPHTHALMIC
Status: COMPLETED | OUTPATIENT
Start: 2024-09-26 | End: 2024-09-26

## 2024-09-26 RX ORDER — OXYCODONE AND ACETAMINOPHEN 5; 325 MG/1; MG/1
1 TABLET ORAL EVERY 4 HOURS PRN
Status: DISCONTINUED | OUTPATIENT
Start: 2024-09-26 | End: 2024-09-26 | Stop reason: HOSPADM

## 2024-09-26 RX ORDER — TETRACAINE HYDROCHLORIDE 5 MG/ML
SOLUTION OPHTHALMIC AS NEEDED
Status: DISCONTINUED | OUTPATIENT
Start: 2024-09-26 | End: 2024-09-26 | Stop reason: HOSPADM

## 2024-09-26 RX ORDER — METOCLOPRAMIDE HYDROCHLORIDE 5 MG/ML
10 INJECTION INTRAMUSCULAR; INTRAVENOUS ONCE AS NEEDED
Status: DISCONTINUED | OUTPATIENT
Start: 2024-09-26 | End: 2024-09-26 | Stop reason: HOSPADM

## 2024-09-26 RX ORDER — LIDOCAINE HYDROCHLORIDE 10 MG/ML
0.1 INJECTION, SOLUTION EPIDURAL; INFILTRATION; INTRACAUDAL; PERINEURAL ONCE
Status: DISCONTINUED | OUTPATIENT
Start: 2024-09-26 | End: 2024-09-26 | Stop reason: HOSPADM

## 2024-09-26 RX ORDER — MOXIFLOXACIN 5 MG/ML
1 SOLUTION/ DROPS OPHTHALMIC
Status: COMPLETED | OUTPATIENT
Start: 2024-09-26 | End: 2024-09-26

## 2024-09-26 RX ORDER — MIDAZOLAM HYDROCHLORIDE 1 MG/ML
INJECTION, SOLUTION INTRAMUSCULAR; INTRAVENOUS AS NEEDED
Status: DISCONTINUED | OUTPATIENT
Start: 2024-09-26 | End: 2024-09-26

## 2024-09-26 RX ORDER — TETRACAINE HYDROCHLORIDE 5 MG/ML
1 SOLUTION OPHTHALMIC ONCE
Status: COMPLETED | OUTPATIENT
Start: 2024-09-26 | End: 2024-09-26

## 2024-09-26 RX ORDER — FENTANYL CITRATE 50 UG/ML
INJECTION, SOLUTION INTRAMUSCULAR; INTRAVENOUS AS NEEDED
Status: DISCONTINUED | OUTPATIENT
Start: 2024-09-26 | End: 2024-09-26

## 2024-09-26 RX ORDER — LIDOCAINE HYDROCHLORIDE 20 MG/ML
INJECTION, SOLUTION INFILTRATION; PERINEURAL AS NEEDED
Status: DISCONTINUED | OUTPATIENT
Start: 2024-09-26 | End: 2024-09-26 | Stop reason: HOSPADM

## 2024-09-26 ASSESSMENT — SLIT LAMP EXAM - LIDS
COMMENTS: NORMAL
COMMENTS: NORMAL

## 2024-09-26 ASSESSMENT — PAIN SCALES - GENERAL
PAINLEVEL_OUTOF10: 0 - NO PAIN
PAINLEVEL_OUTOF10: 0 - NO PAIN
PAIN_LEVEL: 0
PAINLEVEL_OUTOF10: 0 - NO PAIN
PAINLEVEL_OUTOF10: 0 - NO PAIN

## 2024-09-26 ASSESSMENT — COLUMBIA-SUICIDE SEVERITY RATING SCALE - C-SSRS
1. IN THE PAST MONTH, HAVE YOU WISHED YOU WERE DEAD OR WISHED YOU COULD GO TO SLEEP AND NOT WAKE UP?: NO
6. HAVE YOU EVER DONE ANYTHING, STARTED TO DO ANYTHING, OR PREPARED TO DO ANYTHING TO END YOUR LIFE?: NO
2. HAVE YOU ACTUALLY HAD ANY THOUGHTS OF KILLING YOURSELF?: NO

## 2024-09-26 ASSESSMENT — EXTERNAL EXAM - RIGHT EYE: OD_EXAM: NORMAL

## 2024-09-26 ASSESSMENT — PAIN - FUNCTIONAL ASSESSMENT
PAIN_FUNCTIONAL_ASSESSMENT: 0-10

## 2024-09-26 ASSESSMENT — TONOMETRY
OS_IOP_MMHG: 31
IOP_METHOD: GOLDMANN APPLANATION

## 2024-09-26 ASSESSMENT — PACHYMETRY
OS_CT(UM): 619
OD_CT(UM): 626

## 2024-09-26 ASSESSMENT — EXTERNAL EXAM - LEFT EYE: OS_EXAM: NORMAL

## 2024-09-26 ASSESSMENT — VISUAL ACUITY
METHOD: SNELLEN - LINEAR
OS_SC: 20/50

## 2024-09-26 NOTE — DISCHARGE INSTRUCTIONS
Postop instructions     Sleep with shield at night for 7 days  No eye rubbing  May shower and wash your face but no water inside surgery eye  No lifting any weight above 10lbs    Follow up with Dr. Madsen as scheduled this afternoon.

## 2024-09-26 NOTE — ANESTHESIA POSTPROCEDURE EVALUATION
Patient: Roger Dempsey    Procedure Summary       Date: 09/26/24 [facility] / [facility] [place] [place] [facility] [place] [place] [facility]    Anesthesia Start: 0814 Anesthesia Stop: 0857    Procedure: Cataract extraction with intraocular lens implantation (Left: Eye) Diagnosis:       Combined form of age-related cataract, left eye      (Combined form of age-related cataract, left eye [H25.812])    Surgeons: Hardeep Madsen MD Responsible Provider: Rina Turpin MD    Anesthesia Type: MAC ASA Status: 2            Anesthesia Type: MAC    Vitals Value Taken Time   /64 09/26/24 0900   Temp 36 °C (96.8 °F) 09/26/24 0851   Pulse 63 09/26/24 0900   Resp 15 09/26/24 0900   SpO2 98 % 09/26/24 0900       Anesthesia Post Evaluation    Patient location during evaluation: PACU  Patient participation: complete - patient participated  Level of consciousness: awake  Pain score: 0  Pain management: adequate  Airway patency: patent  Cardiovascular status: acceptable  Respiratory status: acceptable  Hydration status: acceptable  Postoperative Nausea and Vomiting: none  Comments: Pt. Was diaphoretic in PACU. All vitals were stable. He remained asymptomatic all the time. ECG was nl. He was able to eat and drink without any problems. He mentioned that he had a similar response years ago to an epidural steroid injection also without any explanation or sequale.         There were no known notable events for this encounter.

## 2024-09-26 NOTE — ANESTHESIA PREPROCEDURE EVALUATION
Patient: Roger Dempsey    Procedure Information       Date/Time: 09/26/24 0810    Procedure: Cataract extraction with intraocular lens implantation (Left)    [place] [facility] [place] [facility] [place] [facility] [facility] [place]    Surgeons: Hardeep Madsen MD            Relevant Problems   Cardiac   (+) Hypercholesterolemia      GI   (+) Gastroesophageal reflux disease without esophagitis      Skin   (+) Rash and other nonspecific skin eruption       Clinical information reviewed:   Tobacco  Allergies  Meds   Med Hx  Surg Hx   Fam Hx  Soc Hx        NPO Detail:  NPO/Void Status  Date of Last Liquid: 09/25/24  Time of Last Liquid: 2100  Date of Last Solid: 09/25/24  Time of Last Solid: 2100         Physical Exam    Airway  Mallampati: I  TM distance: >3 FB  Neck ROM: full     Cardiovascular - normal exam     Dental - normal exam     Pulmonary - normal exam     Abdominal - normal exam             Anesthesia Plan    History of general anesthesia?: yes  History of complications of general anesthesia?: no    ASA 2     MAC     intravenous induction   Anesthetic plan and risks discussed with patient.    Plan discussed with CRNA.

## 2024-09-26 NOTE — OP NOTE
Cataract extraction with intraocular lens implantation (L) Operative Note     Date: 2024  OR Location: [facility] OR    Name: Roger Dempsey, : 1970, Age: 54 y.o., MRN: [id number], Sex: male    Diagnosis  Pre-op Diagnosis      * Combined form of age-related cataract, left eye [H25.812] Post-op Diagnosis     * Combined form of age-related cataract, left eye [H25.812]     Procedures  Cataract extraction with intraocular lens implantation  27679 - [place] XCAPSL CTRC RMVL INSJ IO LENS PROSTH W/O ECP      Surgeons      * Hardeep Madsen - Primary    Resident/Fellow/Other Assistant:  Surgeons and Role:  * No surgeons found with a matching role *    Procedure Summary  Anesthesia: Monitor Anesthesia Care  ASA: II  Anesthesia Staff: Anesthesiologist: Rina Turpin MD  CRNA: CARINA Lock  Estimated Blood Loss: 0mL  Intra-op Medications:   Administrations occurring from 0810 to 0850 on 24:   Medication Name Total Dose   balanced salts (BSS) intraocular solution 500 mL   moxifloxacin (Vigamox) 0.5 % ophthalmic solution 1 drop   lidocaine (Xylocaine) 20 mg/mL (2 %) injection 1 mL   chondroitin sulf-sod hyaluron (Duovisc) intraocular kit 0.55 mL   triamcinolone acetonide (Kenalog-40) injection 40 mg   tetracaine (PF) 0.5 % ophthalmic solution 2 drop              Anesthesia Record               Intraprocedure I/O Totals          Output    Est. Blood Loss 0.5 mL    Total Output 0.5 mL          Specimen: No specimens collected     Staff:   Circulator: Dorita  Scrub Person: Tyesha         Drains and/or Catheters: * None in log *    Tourniquet Times:         Implants:  Implants       Type Name Action Serial No.       ENVISTA ASPIRE Implanted [id number]              Findings: Cataract, zonular subluxation OS    Indications: Roger Dempsey is an 54 y.o. male who is having surgery for Combined form of age-related cataract, left eye [H25.812].     The patient was seen in the preoperative area. The risks,  benefits, complications, treatment options, non-operative alternatives, expected recovery and outcomes were discussed with the patient. The possibilities of reaction to medication, pulmonary aspiration, injury to surrounding structures, bleeding, recurrent infection, the need for additional procedures, failure to diagnose a condition, and creating a complication requiring transfusion or operation were discussed with the patient. The patient concurred with the proposed plan, giving informed consent.  The site of surgery was properly noted/marked if necessary per policy. The patient has been actively warmed in preoperative area. Preoperative antibiotics have been ordered and given within 1 hours of incision. Venous thrombosis prophylaxis are not indicated.    Procedure Details: The patient was placed in the supine position on the operating room table where appropriate blood pressure and cardiac monitoring were initiated. The patient was prepped and draped in the usual sterile fashion for intraocular surgery. This included instillation of Betadine 5% onto the ocular surface followed by irrigation with balance salt solution a minute or two later. A lid speculum was placed and the operating microscope was positioned. One paracentesis stab incision was made to the left of the planned cataract incision with a 15-degree supersharp blade. 1 ml of preservative free lidocaine was injected into the AC. Viscoat was used to replace the aqueous humor. A temporal clear corneal wound was fashioned beginning at the limbus with a 2.2 mm keratome, extending 2 mm into clear cornea before entering the anterior chamber. A continuous tear circular capsulorhexis of approximately 5 mm in diameter was performed. Hydrodissection was performed using a Gaona canula. The endothelium was coated with viscoat again. Using the Ozil handpiece on the SkyCache Lens Removal System, the nucleus was emulsified and aspirated using a divide-and-conquer  technique. During nuclear removal, about it was note that there was generalized zonular laxity. Residual cortex was removed from the eye with the irrigation/aspiration bimanually. A preloaded capsular tension ring was inserted into the capsular bag to support the area of dehiscence. ProVisc was used to inflate the capsular bag. The lens implant was inspected and found to be free of visible defects. The lens used was an Envista Aspira, power 12.5 diopter intraocular lens. The lens was inserted into the capsular bag using the [place] insertion mechanism. The lens was centered with a Guardado hook. the haptics were placed so that they would further support the area of dialysis. Residual viscoelastic was removed from the eye with the irrigation/aspiration instrument. The wounds were checked and found to be watertight. The lid speculum was removed and the eye was dressed with Maxitrol ointment, eye pad, tape, and shield. The patient tolerated the procedure well, and there were no complications.   Complications:  None; patient tolerated the procedure well.    Disposition: PACU - hemodynamically stable.  Condition: stable         Additional Details: None    Attending Attestation: I performed the procedure.    Hardeep Madsen  Phone Number: [phone]

## 2024-09-26 NOTE — PROGRESS NOTES
POD 1 s/p phaco/CTR OS  IOP 31  Instilled one drop of combigan  Dropless  f/u 1 wk  ER precautions   PO instructions

## 2024-09-26 NOTE — BRIEF OP NOTE
Date: 2024  OR Location: [facility] OR    Name: Roger Dempsey, : 1970, Age: 54 y.o., MRN: [id number], Sex: male    Diagnosis  Pre-op Diagnosis      * Combined form of age-related cataract, left eye [H25.812] Post-op Diagnosis     * Combined form of age-related cataract, left eye [H25.812]     Procedures  Cataract extraction with intraocular lens implantation  00274 - [place] XCAPSL CTRC RMVL INSJ IO LENS PROSTH W/O ECP      Surgeons      * Hardeep Madsen - Primary    Resident/Fellow/Other Assistant:  Surgeons and Role:  * No surgeons found with a matching role *    Procedure Summary  Anesthesia: Monitor Anesthesia Care  ASA: II  Anesthesia Staff: Anesthesiologist: Rina Turpin MD  CRNA: BARB Lock-CRNA  Estimated Blood Loss: 0mL  Intra-op Medications:   Administrations occurring from 0810 to 0850 on 24:   Medication Name Total Dose   balanced salts (BSS) intraocular solution 500 mL   moxifloxacin (Vigamox) 0.5 % ophthalmic solution 1 drop   lidocaine (Xylocaine) 20 mg/mL (2 %) injection 1 mL   chondroitin sulf-sod hyaluron (Duovisc) intraocular kit 0.55 mL   triamcinolone acetonide (Kenalog-40) injection 40 mg   tetracaine (PF) 0.5 % ophthalmic solution 2 drop              Anesthesia Record               Intraprocedure I/O Totals          Output    Est. Blood Loss 0.5 mL    Total Output 0.5 mL          Specimen: No specimens collected     Staff:   Circulator: Dorita  Scrub Person: Tyesha          Findings: Cataract OS, zonular laxity    Complications:  None; patient tolerated the procedure well.     Disposition: PACU - hemodynamically stable.  Condition: stable  Specimens Collected: No specimens collected  Attending Attestation: I performed the procedure.    Hardeep Madsen  Phone Number: [phone]

## 2024-09-27 ASSESSMENT — PAIN SCALES - GENERAL: PAINLEVEL_OUTOF10: 0 - NO PAIN

## 2024-09-30 DIAGNOSIS — E78.00 HYPERCHOLESTEROLEMIA: ICD-10-CM

## 2024-09-30 RX ORDER — ROSUVASTATIN CALCIUM 5 MG/1
5 TABLET, COATED ORAL DAILY
Qty: 100 TABLET | Refills: 0 | Status: SHIPPED | OUTPATIENT
Start: 2024-09-30

## 2024-10-11 ENCOUNTER — APPOINTMENT (OUTPATIENT)
Dept: OPHTHALMOLOGY | Facility: CLINIC | Age: 54
End: 2024-10-11
Payer: COMMERCIAL

## 2024-10-11 DIAGNOSIS — H25.812 COMBINED FORM OF AGE-RELATED CATARACT, LEFT EYE: Primary | ICD-10-CM

## 2024-10-11 PROCEDURE — 99024 POSTOP FOLLOW-UP VISIT: CPT | Performed by: OPHTHALMOLOGY

## 2024-10-11 ASSESSMENT — PACHYMETRY
OS_CT(UM): 619
OD_CT(UM): 626

## 2024-10-11 ASSESSMENT — CONF VISUAL FIELD
OS_SUPERIOR_NASAL_RESTRICTION: 0
OD_SUPERIOR_TEMPORAL_RESTRICTION: 0
OD_SUPERIOR_NASAL_RESTRICTION: 0
OS_SUPERIOR_TEMPORAL_RESTRICTION: 0
OD_INFERIOR_NASAL_RESTRICTION: 0
OD_INFERIOR_TEMPORAL_RESTRICTION: 0
OS_INFERIOR_NASAL_RESTRICTION: 0
OS_INFERIOR_TEMPORAL_RESTRICTION: 0
OD_NORMAL: 1
OS_NORMAL: 1

## 2024-10-11 ASSESSMENT — EXTERNAL EXAM - RIGHT EYE: OD_EXAM: NORMAL

## 2024-10-11 ASSESSMENT — VISUAL ACUITY
OS_SC+: +2
METHOD: SNELLEN - LINEAR
OS_SC: 20/40

## 2024-10-11 ASSESSMENT — SLIT LAMP EXAM - LIDS
COMMENTS: NORMAL
COMMENTS: NORMAL

## 2024-10-11 ASSESSMENT — TONOMETRY
IOP_METHOD: TONOPEN
OS_IOP_MMHG: 16

## 2024-10-11 ASSESSMENT — EXTERNAL EXAM - LEFT EYE: OS_EXAM: NORMAL

## 2024-11-10 ENCOUNTER — TELEMEDICINE (OUTPATIENT)
Dept: PRIMARY CARE | Facility: CLINIC | Age: 54
End: 2024-11-10
Payer: COMMERCIAL

## 2024-11-10 DIAGNOSIS — J06.9 UPPER RESPIRATORY INFECTION, ACUTE: Primary | ICD-10-CM

## 2024-11-10 PROCEDURE — 1036F TOBACCO NON-USER: CPT | Performed by: NURSE PRACTITIONER

## 2024-11-10 PROCEDURE — 99213 OFFICE O/P EST LOW 20 MIN: CPT | Performed by: NURSE PRACTITIONER

## 2024-11-10 ASSESSMENT — ENCOUNTER SYMPTOMS
RHINORRHEA: 1
SHORTNESS OF BREATH: 0
APPETITE CHANGE: 1
CHILLS: 0
NAUSEA: 1
HEADACHES: 1
COUGH: 1
VOMITING: 0
SORE THROAT: 1
WHEEZING: 0
FATIGUE: 1
CHEST TIGHTNESS: 0
MYALGIAS: 1
ABDOMINAL PAIN: 0
DIARRHEA: 0
FEVER: 0

## 2024-11-10 NOTE — PROGRESS NOTES
Symptoms started on 11/6/24 with a sore throat (that is now gone). Pt has congestion and headache. Pt was feeling better Friday and then seemed to worsened. He has a cough, headache, nasal congestion. Patient has been taking cold medicine and it helped slightly. He also took ibuprofen and it helped.          Review of Systems   Constitutional:  Positive for appetite change and fatigue. Negative for chills and fever.   HENT:  Positive for congestion, postnasal drip, rhinorrhea and sore throat.    Respiratory:  Positive for cough. Negative for chest tightness, shortness of breath and wheezing.    Cardiovascular:  Negative for chest pain.   Gastrointestinal:  Positive for nausea. Negative for abdominal pain, diarrhea and vomiting.   Musculoskeletal:  Positive for myalgias.   Neurological:  Positive for headaches.       Objective   There were no vitals taken for this visit.    Physical Exam  Constitutional:       General: He is not in acute distress.     Appearance: Normal appearance. He is not toxic-appearing.   Pulmonary:      Effort: Pulmonary effort is normal.   Neurological:      Mental Status: He is alert.     Physical exam not done as this was virtual visit    Assessment/Plan   Problem List Items Addressed This Visit    None  Visit Diagnoses         Codes    Upper respiratory infection, acute    -  Primary J06.9        This visit was completed via Epic. All issues as below were discussed and addressed but no physical exam was performed. If it was felt that the patient should be evaluated in the clinic then they were directed there. The patient verbally consented to the visit.  The patient confirmed he is in [place] during the visit.    Approximately 15 minutes spent performing virtual video visit.     Patient's symptoms started four days ago. He did not check for COVID and he was advised to do so as if he has COVID, antibiotics won't be effective. Advised patient on use of humidifier and hot steam treatments.  Discussed that patient is to drink plenty of fluids and stay well hydrated. Can take tylenol or motrin as needed for any fevers or discomfort. Patient denied the need for me to send in a rx for flonase. Discussed that patient is to go to the ER for any chest pain, difficulty swallowing, difficulty breathing, shortness of breath or new/concerning symptoms; he agreed. Patient advised to follow up in person in 2-3 days if symptoms persist; he agreed

## 2024-11-21 ENCOUNTER — OFFICE VISIT (OUTPATIENT)
Dept: URGENT CARE | Age: 54
End: 2024-11-21
Payer: COMMERCIAL

## 2024-11-21 VITALS
HEIGHT: 68 IN | TEMPERATURE: 98.1 F | DIASTOLIC BLOOD PRESSURE: 70 MMHG | WEIGHT: 176 LBS | BODY MASS INDEX: 26.67 KG/M2 | OXYGEN SATURATION: 97 % | RESPIRATION RATE: 20 BRPM | SYSTOLIC BLOOD PRESSURE: 109 MMHG | HEART RATE: 78 BPM

## 2024-11-21 DIAGNOSIS — J01.90 ACUTE SINUSITIS, RECURRENCE NOT SPECIFIED, UNSPECIFIED LOCATION: Primary | ICD-10-CM

## 2024-11-21 RX ORDER — GUAIFENESIN AND PSEUDOEPHEDRINE HCL 1200; 120 MG/1; MG/1
1 TABLET, EXTENDED RELEASE ORAL 2 TIMES DAILY
Qty: 20 TABLET | Refills: 0 | Status: SHIPPED | OUTPATIENT
Start: 2024-11-21

## 2024-11-21 RX ORDER — AZITHROMYCIN 250 MG/1
TABLET, FILM COATED ORAL
Qty: 6 TABLET | Refills: 0 | Status: SHIPPED | OUTPATIENT
Start: 2024-11-21

## 2024-11-21 ASSESSMENT — PAIN SCALES - GENERAL: PAINLEVEL_OUTOF10: 4

## 2024-11-21 NOTE — PROGRESS NOTES
Subjective   Patient ID: Roger Dempsey is a 54 y.o. male. They present today with a chief complaint of Illness (Over a week, dry cough, yellow mucus, sinus congestion, headaches, sore throat at night.).    History of Present Illness  Patient is a pleasant 54-year-old white male, past medical history of hyperlipidemia, presenting to the clinic for chief complaint of upper respiratory congestion.  Patient reporting approximately 10-day history of upper respiratory congestion with rhinorrhea.  States over the past couple of days he has been experiencing increasing sinus pain and pressure and increasingly purulent nasal discharge.  Also reporting increasing postnasal drainage and coughing up yellowish sputum in the mornings.  He denies any chest pain or shortness of breath.  Denies any cough throughout the day and states that is mostly at night.  No fever or chills.  No abdominal pain, nausea, vomiting.  No myalgias, arthralgias, generalized weakness, fatigue, numbness, tingling, focal weakness.  Patient notes he has been using DayQuil and NyQuil at home with only mild short-term relief.      Illness      Past Medical History  Allergies as of 11/21/2024    (No Known Allergies)       (Not in a hospital admission)         Past Medical History:   Diagnosis Date    Retinal detachment Oct 2022       Past Surgical History:   Procedure Laterality Date    OTHER SURGICAL HISTORY      RETINAL DETACHMENT SURGERY  Oct 2022        reports that he has never smoked. He has never used smokeless tobacco. He reports that he does not currently use alcohol after a past usage of about 1.0 standard drink of alcohol per week. He reports that he does not use drugs.    Review of Systems  Review of Systems       All review of systems negative unless stated in HPI.                         Objective    Vitals:    11/21/24 0947   BP: 109/70   BP Location: Left arm   Patient Position: Sitting   BP Cuff Size: Adult   Pulse: 78   Resp: 20   Temp: 36.7  "°C (98.1 °F)   TempSrc: Oral   SpO2: 97%   Weight: 79.8 kg (176 lb)   Height: 1.727 m (5' 8\")     No LMP for male patient.    Physical Exam    General: Vitals Noted. No distress. Normocephalic.     HEENT: TMs normal, EOMI, normal conjunctiva, patent nares with erythematous edematous and appear nasal turbinates and clear rhinorrhea bilaterally.  Posterior oropharynx with signs of postnasal drainage without any erythema swelling or tonsillar exudate.  Uvula is in the midline and nonedematous.  No drooling.  No trismus.  There is positive tenderness to palpation and percussion over bilateral frontal sinuses with no overlying skin changes.    Neck: Supple with no adenopathy.     Cardiac: Regular Rate and Rhythm. No murmur.     Pulmonary: Equal breath sounds bilaterally. No wheezes, rhonchi, or rales.    Abdomen: Soft, non-tender, with normal bowel sounds.     Musculoskeletal: Moves all extremities, no effusion, no edema.     Skin: No obvious rashes.    Procedures    Point of Care Test & Imaging Results from this visit    No results found.    Diagnostic study results (if any) were reviewed by Alex Naranjo PA-C.    Assessment/Plan   Allergies, medications, history, and pertinent labs/EKGs/Imaging reviewed by Alex Naranjo PA-C.     Medical Decision Making  Patient was seen eval in the clinic with complaint of upper respiratory congestion with increasing sinus pain and pressure.  On exam patient is nontoxic well-appearing respect comfortably no acute distress.  Vital signs are stable, afebrile.  Chest clear heart is regular, belly soft and nontender.  ENT exam as above consistent with a developing acute sinusitis.  Will provide a Z-Sebastian and also advised to use Mucinex D as needed for his cough and congestion.  Advised to follow close with his primary care physician next 2 to 3 days.  Advised plenty of fluids and rest at home as well.  Advised on ibuprofen as needed for pain or discomfort.  I reviewed, strict " return versus report to ED precautions with the patient.  He expresses understanding and agreement plan of care.      Orders and Diagnoses  Diagnoses and all orders for this visit:  Acute sinusitis, recurrence not specified, unspecified location  -     azithromycin (Zithromax) 250 mg tablet; Take 2 tablets for one day then 1 tablet per day for 4 days  -     pseudoephedrine-guaifenesin (Mucinex D Maximum Strength) 120-1,200 mg tablet extended release 12 hr; Take 1 tablet by mouth 2 times a day.        Medical Admin Record      Follow Up Instructions  No follow-ups on file.    Patient disposition: Home    Electronically signed by Alxe Naranjo PA-C  10:13 AM

## 2024-11-27 ENCOUNTER — APPOINTMENT (OUTPATIENT)
Dept: RADIOLOGY | Facility: HOSPITAL | Age: 54
End: 2024-11-27
Payer: COMMERCIAL

## 2024-11-27 ENCOUNTER — HOSPITAL ENCOUNTER (EMERGENCY)
Facility: HOSPITAL | Age: 54
Discharge: HOME | End: 2024-11-27
Attending: EMERGENCY MEDICINE
Payer: COMMERCIAL

## 2024-11-27 VITALS
HEIGHT: 68 IN | OXYGEN SATURATION: 98 % | SYSTOLIC BLOOD PRESSURE: 149 MMHG | HEART RATE: 68 BPM | RESPIRATION RATE: 18 BRPM | WEIGHT: 175 LBS | DIASTOLIC BLOOD PRESSURE: 89 MMHG | BODY MASS INDEX: 26.52 KG/M2 | TEMPERATURE: 97.5 F

## 2024-11-27 DIAGNOSIS — N20.0 NEPHROLITHIASIS: ICD-10-CM

## 2024-11-27 DIAGNOSIS — K59.00 CONSTIPATION, UNSPECIFIED CONSTIPATION TYPE: Primary | ICD-10-CM

## 2024-11-27 LAB
ALBUMIN SERPL BCP-MCNC: 4.3 G/DL (ref 3.4–5)
ALP SERPL-CCNC: 87 U/L (ref 33–120)
ALT SERPL W P-5'-P-CCNC: 18 U/L (ref 10–52)
AMORPH CRY #/AREA UR COMP ASSIST: ABNORMAL /HPF
ANION GAP SERPL CALC-SCNC: 12 MMOL/L (ref 10–20)
APPEARANCE UR: ABNORMAL
AST SERPL W P-5'-P-CCNC: 19 U/L (ref 9–39)
BASOPHILS # BLD AUTO: 0.04 X10*3/UL (ref 0–0.1)
BASOPHILS NFR BLD AUTO: 0.4 %
BILIRUB SERPL-MCNC: 0.8 MG/DL (ref 0–1.2)
BILIRUB UR STRIP.AUTO-MCNC: NEGATIVE MG/DL
BUN SERPL-MCNC: 18 MG/DL (ref 6–23)
CALCIUM SERPL-MCNC: 9.9 MG/DL (ref 8.6–10.3)
CHLORIDE SERPL-SCNC: 102 MMOL/L (ref 98–107)
CO2 SERPL-SCNC: 27 MMOL/L (ref 21–32)
COLOR UR: ABNORMAL
CREAT SERPL-MCNC: 1.05 MG/DL (ref 0.5–1.3)
EGFRCR SERPLBLD CKD-EPI 2021: 84 ML/MIN/1.73M*2
EOSINOPHIL # BLD AUTO: 0.02 X10*3/UL (ref 0–0.7)
EOSINOPHIL NFR BLD AUTO: 0.2 %
ERYTHROCYTE [DISTWIDTH] IN BLOOD BY AUTOMATED COUNT: 12.3 % (ref 11.5–14.5)
GLUCOSE SERPL-MCNC: 161 MG/DL (ref 74–99)
GLUCOSE UR STRIP.AUTO-MCNC: NORMAL MG/DL
HCT VFR BLD AUTO: 42.7 % (ref 41–52)
HGB BLD-MCNC: 14.9 G/DL (ref 13.5–17.5)
IMM GRANULOCYTES # BLD AUTO: 0.07 X10*3/UL (ref 0–0.7)
IMM GRANULOCYTES NFR BLD AUTO: 0.7 % (ref 0–0.9)
KETONES UR STRIP.AUTO-MCNC: ABNORMAL MG/DL
LACTATE SERPL-SCNC: 1.9 MMOL/L (ref 0.4–2)
LEUKOCYTE ESTERASE UR QL STRIP.AUTO: NEGATIVE
LIPASE SERPL-CCNC: 29 U/L (ref 9–82)
LYMPHOCYTES # BLD AUTO: 1.19 X10*3/UL (ref 1.2–4.8)
LYMPHOCYTES NFR BLD AUTO: 11.7 %
MCH RBC QN AUTO: 29.2 PG (ref 26–34)
MCHC RBC AUTO-ENTMCNC: 34.9 G/DL (ref 32–36)
MCV RBC AUTO: 84 FL (ref 80–100)
MONOCYTES # BLD AUTO: 0.45 X10*3/UL (ref 0.1–1)
MONOCYTES NFR BLD AUTO: 4.4 %
MUCOUS THREADS #/AREA URNS AUTO: ABNORMAL /LPF
NEUTROPHILS # BLD AUTO: 8.4 X10*3/UL (ref 1.2–7.7)
NEUTROPHILS NFR BLD AUTO: 82.6 %
NITRITE UR QL STRIP.AUTO: NEGATIVE
NRBC BLD-RTO: 0 /100 WBCS (ref 0–0)
PH UR STRIP.AUTO: 8.5 [PH]
PLATELET # BLD AUTO: 363 X10*3/UL (ref 150–450)
POTASSIUM SERPL-SCNC: 4.4 MMOL/L (ref 3.5–5.3)
PROT SERPL-MCNC: 7 G/DL (ref 6.4–8.2)
PROT UR STRIP.AUTO-MCNC: ABNORMAL MG/DL
RBC # BLD AUTO: 5.11 X10*6/UL (ref 4.5–5.9)
RBC # UR STRIP.AUTO: ABNORMAL /UL
RBC #/AREA URNS AUTO: >20 /HPF
SODIUM SERPL-SCNC: 137 MMOL/L (ref 136–145)
SP GR UR STRIP.AUTO: 1.02
UROBILINOGEN UR STRIP.AUTO-MCNC: NORMAL MG/DL
WBC # BLD AUTO: 10.2 X10*3/UL (ref 4.4–11.3)
WBC #/AREA URNS AUTO: ABNORMAL /HPF

## 2024-11-27 PROCEDURE — 84520 ASSAY OF UREA NITROGEN: CPT | Performed by: EMERGENCY MEDICINE

## 2024-11-27 PROCEDURE — 2550000001 HC RX 255 CONTRASTS: Performed by: EMERGENCY MEDICINE

## 2024-11-27 PROCEDURE — 36415 COLL VENOUS BLD VENIPUNCTURE: CPT | Performed by: EMERGENCY MEDICINE

## 2024-11-27 PROCEDURE — 81001 URINALYSIS AUTO W/SCOPE: CPT | Performed by: EMERGENCY MEDICINE

## 2024-11-27 PROCEDURE — 2500000004 HC RX 250 GENERAL PHARMACY W/ HCPCS (ALT 636 FOR OP/ED): Performed by: EMERGENCY MEDICINE

## 2024-11-27 PROCEDURE — 83690 ASSAY OF LIPASE: CPT | Performed by: EMERGENCY MEDICINE

## 2024-11-27 PROCEDURE — 99284 EMERGENCY DEPT VISIT MOD MDM: CPT | Mod: 25 | Performed by: EMERGENCY MEDICINE

## 2024-11-27 PROCEDURE — 74177 CT ABD & PELVIS W/CONTRAST: CPT | Performed by: RADIOLOGY

## 2024-11-27 PROCEDURE — 96374 THER/PROPH/DIAG INJ IV PUSH: CPT | Mod: 59 | Performed by: EMERGENCY MEDICINE

## 2024-11-27 PROCEDURE — 85025 COMPLETE CBC W/AUTO DIFF WBC: CPT | Performed by: EMERGENCY MEDICINE

## 2024-11-27 PROCEDURE — 83605 ASSAY OF LACTIC ACID: CPT | Performed by: EMERGENCY MEDICINE

## 2024-11-27 PROCEDURE — 74177 CT ABD & PELVIS W/CONTRAST: CPT

## 2024-11-27 PROCEDURE — 96376 TX/PRO/DX INJ SAME DRUG ADON: CPT | Performed by: EMERGENCY MEDICINE

## 2024-11-27 RX ORDER — IBUPROFEN 600 MG/1
600 TABLET ORAL EVERY 8 HOURS PRN
Qty: 30 TABLET | Refills: 0 | Status: SHIPPED | OUTPATIENT
Start: 2024-11-27

## 2024-11-27 RX ORDER — KETOROLAC TROMETHAMINE 30 MG/ML
15 INJECTION, SOLUTION INTRAMUSCULAR; INTRAVENOUS ONCE
Status: COMPLETED | OUTPATIENT
Start: 2024-11-27 | End: 2024-11-27

## 2024-11-27 RX ORDER — TAMSULOSIN HYDROCHLORIDE 0.4 MG/1
0.4 CAPSULE ORAL DAILY
Qty: 7 CAPSULE | Refills: 0 | Status: SHIPPED | OUTPATIENT
Start: 2024-11-27 | End: 2024-12-04

## 2024-11-27 ASSESSMENT — PAIN SCALES - GENERAL
PAINLEVEL_OUTOF10: 6
PAINLEVEL_OUTOF10: 8
PAINLEVEL_OUTOF10: 4
PAINLEVEL_OUTOF10: 9
PAINLEVEL_OUTOF10: 7

## 2024-11-27 ASSESSMENT — PAIN DESCRIPTION - LOCATION
LOCATION: ABDOMEN

## 2024-11-27 ASSESSMENT — PAIN - FUNCTIONAL ASSESSMENT
PAIN_FUNCTIONAL_ASSESSMENT: 0-10

## 2024-11-27 ASSESSMENT — COLUMBIA-SUICIDE SEVERITY RATING SCALE - C-SSRS
2. HAVE YOU ACTUALLY HAD ANY THOUGHTS OF KILLING YOURSELF?: NO
6. HAVE YOU EVER DONE ANYTHING, STARTED TO DO ANYTHING, OR PREPARED TO DO ANYTHING TO END YOUR LIFE?: NO
1. IN THE PAST MONTH, HAVE YOU WISHED YOU WERE DEAD OR WISHED YOU COULD GO TO SLEEP AND NOT WAKE UP?: NO

## 2024-11-27 ASSESSMENT — PAIN DESCRIPTION - PAIN TYPE
TYPE: ACUTE PAIN
TYPE: ACUTE PAIN

## 2024-11-27 ASSESSMENT — PAIN DESCRIPTION - ORIENTATION: ORIENTATION: LEFT;LOWER

## 2024-11-27 NOTE — ED PROVIDER NOTES
HPI   Chief Complaint   Patient presents with    Flank Pain    Abdominal Pain       HPI  Patient is a 54-year-old male with a past medical history significant for recent sinus infection, GERD, upper lipidemia and GERD who presents emergency room with left flank pain.  He states that the pain started overnight in the left lower quadrant rating to the left flank.  He had some nausea and dry heaves.  Today he was very sweaty when this was occurring.  Denies any dysuria, hematuria, diarrhea, melena or hematochezia when he had last bowel movement today and half ago.  He denies any other symptoms at this time.  Denies any history of nephrolithiasis or intra-abdominal pathology.      PMHx: As above  PSHx: Denies pertinent  FamilyHx: Denies  SocialHx: Denies  Allergies: NKDA  Medications: See Medication Reconciliation     ROS  As above otherwise denies    Physical Exam    GENERAL: Awake and Alert, No Acute Distress but appears uncomfortable  HEENT: AT/NC, PERRL, EOMI, Normal Oropharynx, No Signs of Dehydration  NECK: Normal Inspection, No JVD  CARDIOVASCULAR: RRR, No M/R/G  RESPIRATORY: CTA Bilaterally, No Wheezes, Rales or Rhonchi, Chest Wall Non-tender  ABDOMEN: Soft palpation in the left lower quadrant with some mild guarding but otherwise normal Bowel Sounds, No Distention  BACK: No CVA Tenderness  SKIN: Normal Color, Warm, Dry, No Rashes   EXTREMITIES: Non-Tender, Full ROM, No Pedal Edema  NEURO: A&O x 3, Normal Motor and Sensation, Normal Mood and Affect    Nursing Assessment and Vitals Reviewed    Medical Decision  Patient is a 54-year-old male with a past medical history significant for recent sinus infection, GERD, upper lipidemia and GERD who presents emergency room with left flank pain.  He states that the pain started overnight in the left lower quadrant rating to the left flank.  He had some nausea and dry heaves.  Today he was very sweaty when this was occurring.  Denies any dysuria, hematuria, diarrhea,  melena or hematochezia when he had last bowel movement today and half ago.  He denies any other symptoms at this time.  Denies any history of nephrolithiasis or intra-abdominal pathology.    On evaluation patient is well-appearing and in no acute distress.  Lungs are clear and heart is regular.  Abdomen is soft mildly tender in the left lower quadrant with some guarding.  No CVA tenderness.  Patient is started on Toradol for pain due to concern for possible nephrolithiasis.  A workup was performed.    Workup for patient included labs that revealed normal renal function.  Lactate and lipase are within normal limits.  He had no leukocytosis or anemia.  Urinalysis showed trace ketones and some blood but no signs of infection.  CT of the abdomen and pelvis showed an obstructing 4 mm stone in the distal left ureter as well as a large amount of stool in the left hemicolon.  There is a cyst in the right kidney and a 5 mm lingual lower nodule.  On reevaluation patient feels much improved.  He has been advised of all results including the lung nodule and will follow this up with his primary care physician.  He is given a prescription for Flomax and ibuprofen.  He is to follow-up with his primary care physician.  I offered MiraLAX for the constipation but he states that he has it at home and plans to take it.  He is thoroughly educated on supportive care at home as well as signs and symptoms that should prompt immediate turn to emergency room.                Patient History   Past Medical History:   Diagnosis Date    Retinal detachment Oct 2022     Past Surgical History:   Procedure Laterality Date    OTHER SURGICAL HISTORY      RETINAL DETACHMENT SURGERY  Oct 2022     Family History   Problem Relation Name Age of Onset    Cataracts Mother Paula Dempsey     Blindness Father Swapnil Dempsey     Cataracts Father Swapnil Dempsey     Macular degeneration Father Swapnil Dempsey     Cancer Paternal Grandmother Zainab Dempsey     Cancer  Father's Brother Richar Dempsey      Social History     Tobacco Use    Smoking status: Never    Smokeless tobacco: Never   Substance Use Topics    Alcohol use: Not Currently     Alcohol/week: 1.0 standard drink of alcohol     Types: 1 Cans of beer per week     Comment: social    Drug use: Never       Physical Exam   ED Triage Vitals [11/27/24 0826]   Temperature Heart Rate Respirations BP   36.4 °C (97.5 °F) 62 18 143/87      Pulse Ox Temp src Heart Rate Source Patient Position   100 % -- -- --      BP Location FiO2 (%)     -- --       Physical Exam      ED Course & MDM   Diagnoses as of 11/27/24 1343   Constipation, unspecified constipation type   Nephrolithiasis                 No data recorded     Randolph Coma Scale Score: 15 (11/27/24 0934 : Shantal Jc, GAVIN)                           Medical Decision Making      Procedure  Procedures     Keturah Trevino MD  11/27/24 9523

## 2024-11-27 NOTE — ED TRIAGE NOTES
Left side flank pain that is also in his ABD, he he having nausea and constipation started last night

## 2024-12-04 ENCOUNTER — OFFICE VISIT (OUTPATIENT)
Dept: PRIMARY CARE | Facility: CLINIC | Age: 54
End: 2024-12-04
Payer: COMMERCIAL

## 2024-12-04 VITALS
BODY MASS INDEX: 26.52 KG/M2 | RESPIRATION RATE: 20 BRPM | DIASTOLIC BLOOD PRESSURE: 65 MMHG | HEIGHT: 68 IN | OXYGEN SATURATION: 97 % | SYSTOLIC BLOOD PRESSURE: 110 MMHG | WEIGHT: 175 LBS | HEART RATE: 80 BPM

## 2024-12-04 DIAGNOSIS — E78.00 HYPERCHOLESTEROLEMIA: ICD-10-CM

## 2024-12-04 DIAGNOSIS — K59.03 DRUG-INDUCED CONSTIPATION: ICD-10-CM

## 2024-12-04 DIAGNOSIS — N20.0 NEPHROLITHIASIS: Primary | ICD-10-CM

## 2024-12-04 PROBLEM — H33.22 LEFT RETINAL DETACHMENT: Status: RESOLVED | Noted: 2023-11-06 | Resolved: 2024-12-04

## 2024-12-04 PROBLEM — R52 PAIN, UNSPECIFIED: Status: ACTIVE | Noted: 2021-04-14

## 2024-12-04 PROBLEM — M47.812 FACET ARTHROPATHY, CERVICAL: Status: RESOLVED | Noted: 2023-11-06 | Resolved: 2024-12-04

## 2024-12-04 PROBLEM — L21.9 SEBORRHEIC DERMATITIS, UNSPECIFIED: Status: RESOLVED | Noted: 2018-01-23 | Resolved: 2024-12-04

## 2024-12-04 PROBLEM — B02.29 POST HERPETIC NEURALGIA: Status: RESOLVED | Noted: 2023-11-06 | Resolved: 2024-12-04

## 2024-12-04 PROBLEM — H33.001 RETINAL DETACHMENT OF RIGHT EYE WITH RETINAL BREAK: Status: RESOLVED | Noted: 2023-11-06 | Resolved: 2024-12-04

## 2024-12-04 PROBLEM — R21 RASH AND OTHER NONSPECIFIC SKIN ERUPTION: Status: RESOLVED | Noted: 2018-01-23 | Resolved: 2024-12-04

## 2024-12-04 PROBLEM — S13.4XXA WHIPLASH INJURY TO NECK: Status: RESOLVED | Noted: 2020-12-17 | Resolved: 2024-12-04

## 2024-12-04 PROBLEM — Z00.00 ANNUAL PHYSICAL EXAM: Status: RESOLVED | Noted: 2024-06-13 | Resolved: 2024-12-04

## 2024-12-04 PROBLEM — H33.002 MACULA-ON RHEGMATOGENOUS RETINAL DETACHMENT OF LEFT EYE: Status: RESOLVED | Noted: 2023-11-06 | Resolved: 2024-12-04

## 2024-12-04 PROCEDURE — 99213 OFFICE O/P EST LOW 20 MIN: CPT | Performed by: INTERNAL MEDICINE

## 2024-12-04 PROCEDURE — 3008F BODY MASS INDEX DOCD: CPT | Performed by: INTERNAL MEDICINE

## 2024-12-04 PROCEDURE — 1036F TOBACCO NON-USER: CPT | Performed by: INTERNAL MEDICINE

## 2024-12-04 RX ORDER — TAMSULOSIN HYDROCHLORIDE 0.4 MG/1
0.4 CAPSULE ORAL 2 TIMES DAILY
Qty: 60 CAPSULE | Refills: 2 | Status: SHIPPED | OUTPATIENT
Start: 2024-12-04 | End: 2025-12-04

## 2024-12-04 RX ORDER — PREDNISONE 10 MG/1
TABLET ORAL
Qty: 30 TABLET | Refills: 0 | Status: SHIPPED | OUTPATIENT
Start: 2024-12-04 | End: 2024-12-14

## 2024-12-04 RX ORDER — CIPROFLOXACIN 500 MG/1
500 TABLET ORAL 2 TIMES DAILY
Qty: 20 TABLET | Refills: 0 | Status: SHIPPED | OUTPATIENT
Start: 2024-12-04 | End: 2024-12-14

## 2024-12-04 ASSESSMENT — ENCOUNTER SYMPTOMS
HEMATOLOGIC/LYMPHATIC NEGATIVE: 1
NEUROLOGICAL NEGATIVE: 1
ENDOCRINE NEGATIVE: 1
PSYCHIATRIC NEGATIVE: 1
RESPIRATORY NEGATIVE: 1
CONSTITUTIONAL NEGATIVE: 1
GASTROINTESTINAL NEGATIVE: 1
MUSCULOSKELETAL NEGATIVE: 1
ALLERGIC/IMMUNOLOGIC NEGATIVE: 1
CARDIOVASCULAR NEGATIVE: 1
EYES NEGATIVE: 1

## 2024-12-04 NOTE — ASSESSMENT & PLAN NOTE
Left nephrolithiasis  - start Cipro 500 mg BID for 7 days and Prednisone 10 mg II BID for 3 days and II qD for 3 days and I qD for 3 days -   and Flomax 0.4 mg BID and Educate peanuts and cashews free diet and avoids chocolate and asparagus and beats and monitor and analyze the stone if passed increase fluid intake and avoids dehydration  and strain the urine to catch the stone and send for analysis

## 2024-12-04 NOTE — PROGRESS NOTES
"Subjective   Patient ID: Roger Dempsey is a 54 y.o. male who presents for ER Follow-up (Er follow up-kidney stone 4mm on left ). Did not pass it yet started with pain one week ago and has pain in the lower back and in the front went to ER took Ibuprofen 600 mg that he takes it almost every 8 hours     ER Follow-up         Review of Systems   Constitutional: Negative.    HENT: Negative.     Eyes: Negative.    Respiratory: Negative.     Cardiovascular: Negative.    Gastrointestinal: Negative.    Endocrine: Negative.    Musculoskeletal: Negative.    Skin: Negative.    Allergic/Immunologic: Negative.    Neurological: Negative.    Hematological: Negative.    Psychiatric/Behavioral: Negative.     All other systems reviewed and are negative.      Objective   Pulse 80   Resp 20   Ht 1.727 m (5' 8\")   Wt 79.4 kg (175 lb)   SpO2 97%   BMI 26.61 kg/m²   Blood pressure 110/65, pulse 80, resp. rate 20, height 1.727 m (5' 8\"), weight 79.4 kg (175 lb), SpO2 97%.   Physical Exam  Vitals and nursing note reviewed.   Constitutional:       Appearance: Normal appearance.   HENT:      Head: Normocephalic and atraumatic.      Right Ear: Tympanic membrane, ear canal and external ear normal.      Left Ear: Tympanic membrane, ear canal and external ear normal. There is no impacted cerumen.      Nose: Nose normal.      Mouth/Throat:      Mouth: Mucous membranes are moist.      Pharynx: Oropharynx is clear.   Eyes:      Extraocular Movements: Extraocular movements intact.      Conjunctiva/sclera: Conjunctivae normal.      Pupils: Pupils are equal, round, and reactive to light.   Cardiovascular:      Rate and Rhythm: Normal rate and regular rhythm.      Pulses: Normal pulses.      Heart sounds: Normal heart sounds. No murmur heard.  Pulmonary:      Effort: Pulmonary effort is normal. No respiratory distress.      Breath sounds: Normal breath sounds. No stridor. No wheezing, rhonchi or rales.   Chest:      Chest wall: No tenderness. "   Abdominal:      General: Abdomen is flat. Bowel sounds are normal. There is no distension.      Palpations: Abdomen is soft. There is no mass.      Tenderness: There is no abdominal tenderness. There is no right CVA tenderness, left CVA tenderness, guarding or rebound.      Hernia: No hernia is present.   Musculoskeletal:         General: Normal range of motion.      Cervical back: Normal range of motion and neck supple.   Skin:     General: Skin is warm.      Capillary Refill: Capillary refill takes less than 2 seconds.   Neurological:      General: No focal deficit present.      Mental Status: He is alert.      Cranial Nerves: No cranial nerve deficit.      Sensory: No sensory deficit.      Motor: No weakness.      Coordination: Coordination normal.      Gait: Gait normal.      Deep Tendon Reflexes: Reflexes normal.   Psychiatric:         Mood and Affect: Mood normal.         Behavior: Behavior normal. Behavior is cooperative.         Thought Content: Thought content normal.         Judgment: Judgment normal.         Assessment/Plan   Problem List Items Addressed This Visit             ICD-10-CM    Hypercholesterolemia E78.00    Nephrolithiasis - Primary N20.0     Left nephrolithiasis  - start Cipro 500 mg BID for 7 days and Prednisone 10 mg II BID for 3 days and II qD for 3 days and I qD for 3 days -   and Flomax 0.4 mg BID and Educate peanuts and cashews free diet and avoids chocolate and asparagus and beats and monitor and analyze the stone if passed increase fluid intake and avoids dehydration  and strain the urine to catch the stone and send for analysis          Relevant Medications    predniSONE (Deltasone) 10 mg tablet    tamsulosin (Flomax) 0.4 mg 24 hr capsule    ciprofloxacin (Cipro) 500 mg tablet    Drug-induced constipation K59.03     Constipation  - associated with abdominal cramping - educate change in lifestyle and needs to give up rice and start Metamucil daily and increase fiber intake, and  Magnesium oxide 400 mg every 4 hours followed by 8 oz of hot water, recently more constipated for one moth, increase water intake and avoid dehydration ,

## 2024-12-21 DIAGNOSIS — F32.A DEPRESSION, UNSPECIFIED DEPRESSION TYPE: ICD-10-CM

## 2024-12-23 RX ORDER — VENLAFAXINE 75 MG/1
75 TABLET ORAL 2 TIMES DAILY
Qty: 180 TABLET | Refills: 0 | Status: SHIPPED | OUTPATIENT
Start: 2024-12-23

## 2024-12-30 ENCOUNTER — APPOINTMENT (OUTPATIENT)
Dept: OPHTHALMOLOGY | Facility: CLINIC | Age: 54
End: 2024-12-30
Payer: COMMERCIAL

## 2025-01-06 DIAGNOSIS — E78.00 HYPERCHOLESTEROLEMIA: ICD-10-CM

## 2025-01-06 RX ORDER — ROSUVASTATIN CALCIUM 5 MG/1
5 TABLET, COATED ORAL DAILY
Qty: 100 TABLET | Refills: 0 | Status: SHIPPED | OUTPATIENT
Start: 2025-01-06

## 2025-03-24 DIAGNOSIS — F32.A DEPRESSION, UNSPECIFIED DEPRESSION TYPE: ICD-10-CM

## 2025-03-24 RX ORDER — VENLAFAXINE 75 MG/1
75 TABLET ORAL 2 TIMES DAILY
Qty: 180 TABLET | Refills: 0 | Status: SHIPPED | OUTPATIENT
Start: 2025-03-24

## 2025-04-15 DIAGNOSIS — E78.00 HYPERCHOLESTEROLEMIA: ICD-10-CM

## 2025-04-15 RX ORDER — ROSUVASTATIN CALCIUM 5 MG/1
5 TABLET, COATED ORAL DAILY
Qty: 100 TABLET | Refills: 0 | Status: SHIPPED | OUTPATIENT
Start: 2025-04-15

## 2025-06-12 ENCOUNTER — APPOINTMENT (OUTPATIENT)
Dept: PRIMARY CARE | Facility: CLINIC | Age: 55
End: 2025-06-12
Payer: COMMERCIAL

## 2025-06-20 DIAGNOSIS — E78.00 HYPERCHOLESTEROLEMIA: ICD-10-CM

## 2025-06-22 DIAGNOSIS — F32.A DEPRESSION, UNSPECIFIED DEPRESSION TYPE: ICD-10-CM

## 2025-06-22 RX ORDER — ROSUVASTATIN CALCIUM 5 MG/1
5 TABLET, COATED ORAL DAILY
Qty: 100 TABLET | Refills: 1 | Status: SHIPPED | OUTPATIENT
Start: 2025-06-22

## 2025-06-23 RX ORDER — VENLAFAXINE 75 MG/1
75 TABLET ORAL 2 TIMES DAILY
Qty: 180 TABLET | Refills: 1 | Status: SHIPPED | OUTPATIENT
Start: 2025-06-23

## 2025-07-15 ASSESSMENT — PROMIS GLOBAL HEALTH SCALE
RATE_GENERAL_HEALTH: VERY GOOD
CARRYOUT_SOCIAL_ACTIVITIES: VERY GOOD
RATE_QUALITY_OF_LIFE: VERY GOOD
RATE_SOCIAL_SATISFACTION: VERY GOOD
RATE_PHYSICAL_HEALTH: VERY GOOD
EMOTIONAL_PROBLEMS: SOMETIMES
RATE_MENTAL_HEALTH: GOOD
RATE_AVERAGE_PAIN: 2
CARRYOUT_PHYSICAL_ACTIVITIES: COMPLETELY
RATE_AVERAGE_FATIGUE: MILD

## 2025-07-16 ENCOUNTER — APPOINTMENT (OUTPATIENT)
Dept: PRIMARY CARE | Facility: CLINIC | Age: 55
End: 2025-07-16
Payer: COMMERCIAL

## 2025-07-16 VITALS
DIASTOLIC BLOOD PRESSURE: 70 MMHG | OXYGEN SATURATION: 98 % | HEART RATE: 86 BPM | WEIGHT: 172 LBS | BODY MASS INDEX: 26.07 KG/M2 | SYSTOLIC BLOOD PRESSURE: 108 MMHG | HEIGHT: 68 IN

## 2025-07-16 DIAGNOSIS — E78.00 HYPERCHOLESTEROLEMIA: ICD-10-CM

## 2025-07-16 DIAGNOSIS — K21.9 GASTROESOPHAGEAL REFLUX DISEASE WITHOUT ESOPHAGITIS: ICD-10-CM

## 2025-07-16 DIAGNOSIS — N20.0 NEPHROLITHIASIS: ICD-10-CM

## 2025-07-16 DIAGNOSIS — Z13.6 SCREENING FOR HEART DISEASE: ICD-10-CM

## 2025-07-16 DIAGNOSIS — Z00.00 ROUTINE GENERAL MEDICAL EXAMINATION AT A HEALTH CARE FACILITY: Primary | ICD-10-CM

## 2025-07-16 DIAGNOSIS — R73.9 HYPERGLYCEMIA: ICD-10-CM

## 2025-07-16 DIAGNOSIS — D22.9 BENIGN SKIN MOLE: ICD-10-CM

## 2025-07-16 PROCEDURE — 99396 PREV VISIT EST AGE 40-64: CPT | Performed by: INTERNAL MEDICINE

## 2025-07-16 PROCEDURE — 3008F BODY MASS INDEX DOCD: CPT | Performed by: INTERNAL MEDICINE

## 2025-07-16 PROCEDURE — 93000 ELECTROCARDIOGRAM COMPLETE: CPT | Performed by: INTERNAL MEDICINE

## 2025-07-16 ASSESSMENT — ENCOUNTER SYMPTOMS
OCCASIONAL FEELINGS OF UNSTEADINESS: 0
DEPRESSION: 0
LOSS OF SENSATION IN FEET: 0

## 2025-07-16 ASSESSMENT — DERMATOLOGY QUALITY OF LIFE (QOL) ASSESSMENT
DATE THE QUALITY-OF-LIFE ASSESSMENT WAS COMPLETED: 67402
RATE HOW BOTHERED YOU ARE BY EFFECTS OF YOUR SKIN PROBLEMS ON YOUR ACTIVITIES (EG, GOING OUT, ACCOMPLISHING WHAT YOU WANT, WORK ACTIVITIES OR YOUR RELATIONSHIPS WITH OTHERS): 0 - NEVER BOTHERED
RATE HOW EMOTIONALLY BOTHERED YOU ARE BY YOUR SKIN PROBLEM (FOR EXAMPLE, WORRY, EMBARRASSMENT, FRUSTRATION): 0 - NEVER BOTHERED
RATE HOW BOTHERED YOU ARE BY EFFECTS OF YOUR SKIN PROBLEMS ON YOUR ACTIVITIES (EG, GOING OUT, ACCOMPLISHING WHAT YOU WANT, WORK ACTIVITIES OR YOUR RELATIONSHIPS WITH OTHERS): 0 - NEVER BOTHERED
WHAT SINGLE SKIN CONDITION LISTED BELOW IS THE PATIENT ANSWERING THE QUALITY-OF-LIFE ASSESSMENT QUESTIONS ABOUT: NONE OF THE ABOVE
RATE HOW BOTHERED YOU ARE BY SYMPTOMS OF YOUR SKIN PROBLEM (EG, ITCHING, STINGING BURNING, HURTING OR SKIN IRRITATION): 0 - NEVER BOTHERED
RATE HOW EMOTIONALLY BOTHERED YOU ARE BY YOUR SKIN PROBLEM (FOR EXAMPLE, WORRY, EMBARRASSMENT, FRUSTRATION): 0 - NEVER BOTHERED
WHAT SINGLE SKIN CONDITION LISTED BELOW IS THE PATIENT ANSWERING THE QUALITY-OF-LIFE ASSESSMENT QUESTIONS ABOUT: NONE OF THE ABOVE
RATE HOW BOTHERED YOU ARE BY SYMPTOMS OF YOUR SKIN PROBLEM (EG, ITCHING, STINGING BURNING, HURTING OR SKIN IRRITATION): 0 - NEVER BOTHERED

## 2025-07-16 ASSESSMENT — PATIENT HEALTH QUESTIONNAIRE - PHQ9
SUM OF ALL RESPONSES TO PHQ9 QUESTIONS 1 AND 2: 0
2. FEELING DOWN, DEPRESSED OR HOPELESS: NOT AT ALL
1. LITTLE INTEREST OR PLEASURE IN DOING THINGS: NOT AT ALL

## 2025-07-16 ASSESSMENT — PATIENT GLOBAL ASSESSMENT (PGA): WHAT IS THE PGA: PATIENT GLOBAL ASSESSMENT:  1 - CLEAR

## 2025-07-16 NOTE — PROGRESS NOTES
"Subjective   Patient ID: Roger Dempsey is a 55 y.o. male who presents for Annual Exam.    HPI patient presents to clinic for routine annual physical examination.  He is concerned about moles on his upper back.  He is otherwise doing well and denies any cough, congestion, abdominal pain nausea vomiting fever chills and swelling of legs.  He has history of gerde,dyslipidemia,kidney stones, impaired fasting glucose and had normal colonoscopy in 2021.  EKG done shows sinus rhythm at 72 bpm with normal axis normal intervals without any ischemic changes.    Review of Systems   Constitutional: Negative.    HENT: Negative.     Eyes: Negative.    Respiratory: Negative.     Cardiovascular: Negative.    Gastrointestinal: Negative.    Endocrine: Negative.    Genitourinary: Negative.    Musculoskeletal: Negative.    Skin: Negative.    Allergic/Immunologic: Negative.    Neurological: Negative.    Hematological: Negative.    Psychiatric/Behavioral: Negative.         Objective   /70 (BP Location: Right arm, Patient Position: Sitting)   Pulse 86   Ht 1.727 m (5' 8\")   Wt 78 kg (172 lb)   SpO2 98%   BMI 26.15 kg/m²     Physical Exam  Constitutional:       Appearance: Normal appearance. He is normal weight.   HENT:      Right Ear: Tympanic membrane normal.      Left Ear: Tympanic membrane and ear canal normal.      Nose: Nose normal.   Neck:      Vascular: No carotid bruit.     Cardiovascular:      Rate and Rhythm: Normal rate.   Pulmonary:      Effort: No respiratory distress.      Breath sounds: No stridor. No wheezing.   Abdominal:      Palpations: Abdomen is soft.      Tenderness: There is no abdominal tenderness. There is no guarding or rebound.     Skin:     Coloration: Skin is not jaundiced.      Findings: No bruising.      Comments: Moles noticed on lower back.     Neurological:      General: No focal deficit present.      Mental Status: He is alert and oriented to person, place, and time.     Psychiatric:         " Mood and Affect: Mood normal.         Assessment/Plan   Assessment & Plan  Routine general medical examination at a health care facility  Patient will be scheduled for routine blood work and CT cardiac scoring regarding screening for heart disease.  He is advised to consider Shingrix vaccination for health maintenance.  He will be notified about test results and will return to clinic annually.  Orders:    Hemoglobin A1c; Future    Lipid Panel; Future    CBC and Auto Differential; Future    Comprehensive metabolic panel; Future    Prostate Spec.Ag,Screen; Future    Urinalysis with Reflex Microscopic; Future    CT cardiac scoring wo IV contrast; Future    Hypercholesterolemia  We will continue lovastatin regarding dyslipidemia.  Orders:    ECG 12 lead (Clinic Performed)    Nephrolithiasis  He is encouraged to drink  fluids and will monitor urinalysis.       Gastroesophageal reflux disease without esophagitis  He will continue omeprazole as needed regarding history of GERD.       Screening for heart disease  To schedule CT cardiac scoring.  Orders:    CT cardiac scoring wo IV contrast; Future    Hyperglycemia  We will monitor serum glucose and hemoglobin A1c.  Orders:    Hemoglobin A1c; Future    Benign skin mole  He will be referred to dermatology clinic regarding moles.  Orders:    Referral to Dermatology

## 2025-07-17 ENCOUNTER — OFFICE VISIT (OUTPATIENT)
Dept: DERMATOLOGY | Facility: CLINIC | Age: 55
End: 2025-07-17
Payer: COMMERCIAL

## 2025-07-17 DIAGNOSIS — L57.8 ACTINIC SKIN DAMAGE: ICD-10-CM

## 2025-07-17 DIAGNOSIS — D22.9 MULTIPLE BENIGN NEVI: ICD-10-CM

## 2025-07-17 DIAGNOSIS — L81.4 LENTIGO: ICD-10-CM

## 2025-07-17 DIAGNOSIS — Z12.83 SCREENING EXAM FOR SKIN CANCER: ICD-10-CM

## 2025-07-17 DIAGNOSIS — D48.5 NEOPLASM OF UNCERTAIN BEHAVIOR OF SKIN: Primary | ICD-10-CM

## 2025-07-17 PROCEDURE — 99203 OFFICE O/P NEW LOW 30 MIN: CPT | Performed by: DERMATOLOGY

## 2025-07-17 PROCEDURE — 1036F TOBACCO NON-USER: CPT | Performed by: DERMATOLOGY

## 2025-07-17 PROCEDURE — 11301 SHAVE SKIN LESION 0.6-1.0 CM: CPT | Performed by: DERMATOLOGY

## 2025-07-17 ASSESSMENT — DERMATOLOGY PATIENT ASSESSMENT
DO YOU USE SUNSCREEN: OCCASIONALLY
HAVE YOU HAD OR DO YOU HAVE VASCULAR DISEASE: NO
HAVE YOU HAD OR DO YOU HAVE A STAPH INFECTION: NO
ARE YOU AN ORGAN TRANSPLANT RECIPIENT: NO
DO YOU HAVE ANY NEW OR CHANGING LESIONS: NO
DO YOU USE A TANNING BED: NO

## 2025-07-17 ASSESSMENT — DERMATOLOGY QUALITY OF LIFE (QOL) ASSESSMENT
DATE THE QUALITY-OF-LIFE ASSESSMENT WAS COMPLETED: 67403
ARE THERE EXCLUSIONS OR EXCEPTIONS FOR THE QUALITY OF LIFE ASSESSMENT: NO
RATE HOW BOTHERED YOU ARE BY SYMPTOMS OF YOUR SKIN PROBLEM (EG, ITCHING, STINGING BURNING, HURTING OR SKIN IRRITATION): 0 - NEVER BOTHERED
RATE HOW EMOTIONALLY BOTHERED YOU ARE BY YOUR SKIN PROBLEM (FOR EXAMPLE, WORRY, EMBARRASSMENT, FRUSTRATION): 0 - NEVER BOTHERED
RATE HOW BOTHERED YOU ARE BY EFFECTS OF YOUR SKIN PROBLEMS ON YOUR ACTIVITIES (EG, GOING OUT, ACCOMPLISHING WHAT YOU WANT, WORK ACTIVITIES OR YOUR RELATIONSHIPS WITH OTHERS): 0 - NEVER BOTHERED

## 2025-07-17 ASSESSMENT — ITCH NUMERIC RATING SCALE: HOW SEVERE IS YOUR ITCHING?: 0

## 2025-07-17 ASSESSMENT — PATIENT GLOBAL ASSESSMENT (PGA): PATIENT GLOBAL ASSESSMENT: PATIENT GLOBAL ASSESSMENT:  1 - CLEAR

## 2025-07-17 NOTE — PROGRESS NOTES
Subjective     Roger Dempsey is a 55 y.o. male who presents for the following: Skin Check. Last derm visit 1/23/18 for seb derm with Norma ALVES    Relevant Full Skin Exam History    Prior Full Skin Exam with a dermatologist: no   Previous hx of melanoma:   no   Hx of abnormal (dysplastic) moles:    no   Hx of sunburn:    yes   Family hx of Melanoma:   no   Immunosuppressive condition: no      Saw his primary care who saw a few spots on back and recommended derm eval.     Intake Questions  Do you have any new or changing Lesions?: No  Are you an organ transplant recipient?: No  Have you had or do you have a Staph Infection?: No  Have you had or do you have Vacular Disease?: No  Do you use sunscreen?: Occasionally  Do you use a tanning bed?: No    Review of Systems:  No other skin or systemic complaints other than what is documented elsewhere in the note.    The following portions of the chart were reviewed this encounter and updated as appropriate:  Tobacco  Allergies  Meds  Problems  Med Hx  Surg Hx         Skin Cancer History  Biopsy Log Book  No skin cancers from Specimen Tracking.    Additional History      Specialty Problems    None       Objective   Well appearing patient in no apparent distress; mood and affect are within normal limits.    A full examination was performed including scalp, head, eyes, ears, nose, lips, neck, chest, axillae, abdomen, back, buttocks, bilateral upper extremities, bilateral lower extremities, hands, feet, fingers, toes, fingernails, and toenails. All findings within normal limits unless otherwise noted below.    Assessment/Plan   Skin Exam  1. NEOPLASM OF UNCERTAIN BEHAVIOR OF SKIN  Left Upper Back  0.4 cm dark brown macule with central smudging of pigment     - Shave removal    Lesion length (cm):  0.4  Lesion width (cm):  0.4  Margin per side (cm):  0.1  Lesion diameter (cm):  0.6  Informed consent: discussed and consent obtained    Timeout: patient name,  date of birth, surgical site, and procedure verified    Procedure prep:  Patient was prepped and draped  Anesthesia: the lesion was anesthetized in a standard fashion    Anesthetic:  1% lidocaine w/ epinephrine 1-100,000 local infiltration  Instrument used: DermaBlade    Hemostasis achieved with: aluminum chloride    Outcome: patient tolerated procedure well    Post-procedure details: sterile dressing applied and wound care instructions given    Dressing type: bandage and petrolatum      - Staff Communication: Dermatology Local Anesthesia: Lidocaine 0.5% with Epinephrine 1;200,000 - Amount: 3 ml  Specimen 1 - Dermatopathology- DERM LAB  Differential Diagnosis: atypical nevus  Check Margins Yes/No?:    Comments:    Dermpath Lab: Routine Histopathology (formalin-fixed tissue)  2. MULTIPLE BENIGN NEVI  Generalized  Brown and tan macules and papules with reassuring findings on dermoscopy  -These lesions have benign, reassuring patterns on dermoscopy  -Recommend continued self observation, and to contact the office if any changes in nevi are noticed  3. LENTIGO  Generalized  Tan macules  -Benign appearing on exam  -Reassurance, recommend observation  4. ACTINIC SKIN DAMAGE  Generalized  Background of photodamage with hyper- and hypo-pigmented macules on the skin  5. SCREENING EXAM FOR SKIN CANCER  Generalized  Full body skin exam  -No lesions concerning for malignancy on the remainder the skin exam today other than what may be separately documented  - The ugly duckling sign was discussed. Monitor for any skin lesions that are different in color, shape, or size than others on body  -Sun protection was discussed. Recommend SPF 30+, hats with brims, sun protective clothing, and avoiding sun exposure between 10 AM and 2 PM whenever possible  -Recommend regular skin exams or sooner if new or changing lesions     This Visit  - Follow Up In Dermatology - Established Patient      Follow up 1-2 years Full Skin Exam

## 2025-07-21 LAB
LABORATORY COMMENT REPORT: NORMAL
PATH REPORT.FINAL DX SPEC: NORMAL
PATH REPORT.GROSS SPEC: NORMAL
PATH REPORT.MICROSCOPIC SPEC OTHER STN: NORMAL
PATH REPORT.RELEVANT HX SPEC: NORMAL
PATH REPORT.TOTAL CANCER: NORMAL

## 2025-07-23 ASSESSMENT — ENCOUNTER SYMPTOMS
ALLERGIC/IMMUNOLOGIC NEGATIVE: 1
CONSTITUTIONAL NEGATIVE: 1
GASTROINTESTINAL NEGATIVE: 1
NEUROLOGICAL NEGATIVE: 1
PSYCHIATRIC NEGATIVE: 1
EYES NEGATIVE: 1
HEMATOLOGIC/LYMPHATIC NEGATIVE: 1
ENDOCRINE NEGATIVE: 1
CARDIOVASCULAR NEGATIVE: 1
MUSCULOSKELETAL NEGATIVE: 1
RESPIRATORY NEGATIVE: 1

## 2025-09-05 ENCOUNTER — APPOINTMENT (OUTPATIENT)
Dept: OPHTHALMOLOGY | Facility: CLINIC | Age: 55
End: 2025-09-05
Payer: COMMERCIAL

## 2025-09-19 ENCOUNTER — APPOINTMENT (OUTPATIENT)
Dept: OPHTHALMOLOGY | Facility: CLINIC | Age: 55
End: 2025-09-19
Payer: COMMERCIAL

## 2025-10-09 ENCOUNTER — APPOINTMENT (OUTPATIENT)
Dept: PRIMARY CARE | Facility: CLINIC | Age: 55
End: 2025-10-09
Payer: COMMERCIAL

## 2026-07-14 ENCOUNTER — APPOINTMENT (OUTPATIENT)
Dept: DERMATOLOGY | Facility: CLINIC | Age: 56
End: 2026-07-14
Payer: COMMERCIAL

## 2026-07-17 ENCOUNTER — APPOINTMENT (OUTPATIENT)
Dept: PRIMARY CARE | Facility: CLINIC | Age: 56
End: 2026-07-17
Payer: COMMERCIAL

## (undated) DEVICE — CANNULA, ANTERIOR CHAMBER, 27 GA

## (undated) DEVICE — NEEDLE, HYPODERMIC, REGULAR WALL, REGULAR BEVEL, 30 G X 0.5 IN

## (undated) DEVICE — SYRINGE, INSULIN, LUER LOCK, 1ML

## (undated) DEVICE — NEEDLE, HYPODERMIC, SAFETY GLIDE, 30G X 0.5IN, WITH LUER LOCK

## (undated) DEVICE — Device

## (undated) DEVICE — NEEDLE, FILTER 19 G X 1 IN

## (undated) DEVICE — GOWN, ASTOUND, XL

## (undated) DEVICE — HANDPIECE,  IRRIGATION/ASPIRATION, 45DEG, 2.2MM-2.8MM, BLUE

## (undated) DEVICE — GLOVE, SURGICAL, PROTEXIS PI , 8.0, PF, LF